# Patient Record
Sex: FEMALE | Race: BLACK OR AFRICAN AMERICAN | NOT HISPANIC OR LATINO | Employment: UNEMPLOYED | URBAN - METROPOLITAN AREA
[De-identification: names, ages, dates, MRNs, and addresses within clinical notes are randomized per-mention and may not be internally consistent; named-entity substitution may affect disease eponyms.]

---

## 2017-07-19 ENCOUNTER — HOSPITAL ENCOUNTER (EMERGENCY)
Facility: MEDICAL CENTER | Age: 22
End: 2017-07-19
Payer: MEDICAID

## 2017-07-19 VITALS
WEIGHT: 113.32 LBS | DIASTOLIC BLOOD PRESSURE: 49 MMHG | HEIGHT: 62 IN | OXYGEN SATURATION: 99 % | TEMPERATURE: 98.6 F | HEART RATE: 69 BPM | RESPIRATION RATE: 20 BRPM | SYSTOLIC BLOOD PRESSURE: 99 MMHG | BODY MASS INDEX: 20.85 KG/M2

## 2017-07-19 PROCEDURE — 302449 STATCHG TRIAGE ONLY (STATISTIC)

## 2017-07-19 ASSESSMENT — PAIN SCALES - GENERAL: PAINLEVEL_OUTOF10: 0

## 2017-07-20 NOTE — ED NOTES
".  Chief Complaint   Patient presents with   • Pregnancy     Patient states that she had positive pregnancy test in May, with confirmation blood work done in  out of town and \"hasn't felt the baby move.\"  Patient states that she estimates she is 16 wks pregnant, but does not have a OB/GYN, and has not received prenatal care.  Patient is a poor historian.  3, Parity 1, one miscarriage.    Denies ABD pain, vaginal bleeding or other symptoms.         "

## 2017-08-14 ENCOUNTER — HOSPITAL ENCOUNTER (OUTPATIENT)
Facility: MEDICAL CENTER | Age: 22
End: 2017-08-14
Attending: OBSTETRICS & GYNECOLOGY | Admitting: OBSTETRICS & GYNECOLOGY

## 2017-08-14 ENCOUNTER — HOSPITAL ENCOUNTER (EMERGENCY)
Facility: MEDICAL CENTER | Age: 22
End: 2017-08-14

## 2017-08-14 VITALS
WEIGHT: 115 LBS | SYSTOLIC BLOOD PRESSURE: 109 MMHG | HEIGHT: 62 IN | BODY MASS INDEX: 21.16 KG/M2 | HEART RATE: 85 BPM | DIASTOLIC BLOOD PRESSURE: 60 MMHG | TEMPERATURE: 98.5 F

## 2017-08-14 LAB
APPEARANCE UR: ABNORMAL
COLOR UR AUTO: YELLOW
GLUCOSE UR QL STRIP.AUTO: NEGATIVE MG/DL
KETONES UR QL STRIP.AUTO: NEGATIVE MG/DL
LEUKOCYTE ESTERASE UR QL STRIP.AUTO: ABNORMAL
NITRITE UR QL STRIP.AUTO: NEGATIVE
PH UR STRIP.AUTO: 6.5 [PH]
PROT UR QL STRIP: ABNORMAL MG/DL
RBC UR QL AUTO: NEGATIVE
SP GR UR: 1.02

## 2017-08-14 PROCEDURE — 81002 URINALYSIS NONAUTO W/O SCOPE: CPT

## 2017-08-15 NOTE — PROGRESS NOTES
"2200 pt arrived to L&D with complaints of right upper quadrant abdominal pain that she states started tonight around 1600 and has since gone away but that she wanted to be checked to make sure every thing is okay. Pt has had no prenatal care with this pregnancy but was seen by [facility] in 2014 with first baby. Per the patient, this is her third pregnancy but she miscarried the last. Pt taken to labor assessment, EFM and TOCO applied, FHR was found in the 135-140's with audible variability. Pt also complains of vaginal pain/itching and a burning sensation and states her vagina is \"swollen\". Per visual assessment, no lesions noted and vaginal area did not seem to be edematous per RN. When asked if she felt the burning when she urinated, she states \"i haven't wanted to go to the bathroom because i was scared it would hurt\". Urine sample obtained and dipped-see results.. Pt repots positive fetal moment, denies any leaking of vaginal fluid or blood and denies feeling any contractions.   2230 HUEY LOONEY updated on pt status, per Cristina, there is no indication at this time for an ultrasound as pt needs to establish prenatal care and be seen by a provider. FHR tracing reviewed and looks reassuring for projected gestational age (from what we went off per the pt, her LMP was in March which would make her EDC around 12/6/17= 23.5 today). Orders received to discharge pt home and instruct her to buy OTC Monistat 7 and use as directed. Pt education provided regarding monistat 7 and pt was instructed to refrain from intercourse for 7 days while taking this medication. Extensive discharge education provided to pt and she verbalized understanding. Pt encouraged and states she will see [facility] ASAP to establish prenatal care. Pt verbalized understanding of all discharge instructions. FHR strip reviewed in department by HUEY LOONEY prior to discharge.   2240 pt discharged home in stable condition.   "

## 2017-12-15 ENCOUNTER — HOSPITAL ENCOUNTER (OUTPATIENT)
Facility: MEDICAL CENTER | Age: 22
End: 2017-12-15
Attending: OBSTETRICS & GYNECOLOGY | Admitting: OBSTETRICS & GYNECOLOGY
Payer: MEDICAID

## 2017-12-15 ENCOUNTER — HOSPITAL ENCOUNTER (EMERGENCY)
Facility: MEDICAL CENTER | Age: 22
End: 2017-12-15
Payer: COMMERCIAL

## 2017-12-15 VITALS
BODY MASS INDEX: 23.37 KG/M2 | TEMPERATURE: 98.3 F | HEIGHT: 62 IN | WEIGHT: 127 LBS | HEART RATE: 81 BPM | DIASTOLIC BLOOD PRESSURE: 76 MMHG | SYSTOLIC BLOOD PRESSURE: 121 MMHG

## 2017-12-15 LAB
ABO GROUP BLD: NORMAL
BASOPHILS # BLD AUTO: 0.4 % (ref 0–1.8)
BASOPHILS # BLD: 0.03 K/UL (ref 0–0.12)
BLD GP AB SCN SERPL QL: NORMAL
EOSINOPHIL # BLD AUTO: 0.02 K/UL (ref 0–0.51)
EOSINOPHIL NFR BLD: 0.2 % (ref 0–6.9)
ERYTHROCYTE [DISTWIDTH] IN BLOOD BY AUTOMATED COUNT: 43.8 FL (ref 35.9–50)
HBV SURFACE AG SER QL: NEGATIVE
HCT VFR BLD AUTO: 36.6 % (ref 37–47)
HCV AB SER QL: NEGATIVE
HGB BLD-MCNC: 12.1 G/DL (ref 12–16)
HIV 1+2 AB+HIV1 P24 AG SERPL QL IA: NON REACTIVE
IMM GRANULOCYTES # BLD AUTO: 0.04 K/UL (ref 0–0.11)
IMM GRANULOCYTES NFR BLD AUTO: 0.5 % (ref 0–0.9)
LYMPHOCYTES # BLD AUTO: 2.13 K/UL (ref 1–4.8)
LYMPHOCYTES NFR BLD: 25.3 % (ref 22–41)
MCH RBC QN AUTO: 30 PG (ref 27–33)
MCHC RBC AUTO-ENTMCNC: 33.1 G/DL (ref 33.6–35)
MCV RBC AUTO: 90.8 FL (ref 81.4–97.8)
MONOCYTES # BLD AUTO: 0.82 K/UL (ref 0–0.85)
MONOCYTES NFR BLD AUTO: 9.8 % (ref 0–13.4)
NEUTROPHILS # BLD AUTO: 5.37 K/UL (ref 2–7.15)
NEUTROPHILS NFR BLD: 63.8 % (ref 44–72)
NRBC # BLD AUTO: 0 K/UL
NRBC BLD AUTO-RTO: 0 /100 WBC
PLATELET # BLD AUTO: 177 K/UL (ref 164–446)
PMV BLD AUTO: 11.2 FL (ref 9–12.9)
RBC # BLD AUTO: 4.03 M/UL (ref 4.2–5.4)
RH BLD: NORMAL
RUBV AB SER QL: 89.8 IU/ML
TREPONEMA PALLIDUM IGG+IGM AB [PRESENCE] IN SERUM OR PLASMA BY IMMUNOASSAY: NON REACTIVE
WBC # BLD AUTO: 8.4 K/UL (ref 4.8–10.8)

## 2017-12-15 PROCEDURE — 59025 FETAL NON-STRESS TEST: CPT | Performed by: FAMILY MEDICINE

## 2017-12-15 PROCEDURE — 86850 RBC ANTIBODY SCREEN: CPT

## 2017-12-15 PROCEDURE — 86901 BLOOD TYPING SEROLOGIC RH(D): CPT

## 2017-12-15 PROCEDURE — 85025 COMPLETE CBC W/AUTO DIFF WBC: CPT

## 2017-12-15 PROCEDURE — 86762 RUBELLA ANTIBODY: CPT

## 2017-12-15 PROCEDURE — 87340 HEPATITIS B SURFACE AG IA: CPT

## 2017-12-15 PROCEDURE — 86780 TREPONEMA PALLIDUM: CPT

## 2017-12-15 PROCEDURE — 36415 COLL VENOUS BLD VENIPUNCTURE: CPT

## 2017-12-15 PROCEDURE — 86803 HEPATITIS C AB TEST: CPT

## 2017-12-15 PROCEDURE — 86900 BLOOD TYPING SEROLOGIC ABO: CPT

## 2017-12-15 PROCEDURE — 87389 HIV-1 AG W/HIV-1&-2 AB AG IA: CPT

## 2017-12-16 NOTE — PROGRESS NOTES
" EDC  39w3d. Pt presents to L&D with complaints of \"pain\". PT taken to triage for assessment.      TOCO and EFM applied, VSS. PT stated that she has had two visits with [facility] and an US giving her the due date of . No records on file, will attempt to obtain. Pt stated that she never got any of her prenatal labs drawn. Pt reports +FM, denies LOF or VB. SVE fingertip/50/ballot     Dr. Harp paged     Dr. Harp returned page, stated that pt is no longer being seen by their practice because she failed to get her PNL and return for her follow up appointments. PT did have an US done on  giving her an SWATHI of . The fetus was breech at the time. Will contact GURU Hawk regarding [facility] [place] status.     2218 GURU Hawk CNM updated, orders to collect PNL, GBS and have US at bedside to verify fetal positioning.     2223 GURU Hawk at bedside, vertex. GURU Hawk reviewed tracing, okay to discharge home once labs have been drawn.     2230 Lab at bedside    2235 RN at bedside, labor precautions given, pt verbalized understanding.     2240 Pt discharged home in stable condition.   "

## 2017-12-16 NOTE — PROGRESS NOTES
"S: Pt is a 22 y.o.  at 39w3d according to stated 28wk US through [facility]. who presents to triage c/o CTXs.  No VB, RUCs, LOF.  Reports positve FM.  She had one prenatal visit with [facility] on 2017 and did not follow up and has since been fired form their practice.     O: /76   Pulse 81   Temp 36.8 °C (98.3 °F)   Ht 1.575 m (5' 2\")   Wt 57.6 kg (127 lb)   LMP 10/30/2014   BMI 23.23 kg/m²          FHTs: baseline 120, + accels, - decels, moderate variability       [place]:  UCs q 2-3 minutes apart, palpating mild       SVE: FT/50/ballotable per RN exam unable to determine presentation       Presentation: vertex- confirmed by BSUS         A/P  Patient Active Problem List    Diagnosis Date Noted   • Labor and delivery, indication for care 2014   • Supervision of normal first pregnancy 2014   • Late prenatal care @ 22 wks  2014       1.  IUP @ Unknown  2.  Reactive NST.  3.  Prenatal labs  4.  GBS  5. Labor precautions given, pt will f/u at L&D if she feels she is in active labor    Sherry Hawk CNM, APRN      "

## 2017-12-24 ENCOUNTER — HOSPITAL ENCOUNTER (INPATIENT)
Facility: MEDICAL CENTER | Age: 22
LOS: 2 days | End: 2017-12-26
Attending: OBSTETRICS & GYNECOLOGY | Admitting: OBSTETRICS & GYNECOLOGY
Payer: MEDICAID

## 2017-12-24 LAB
BASOPHILS # BLD AUTO: 0.3 % (ref 0–1.8)
BASOPHILS # BLD: 0.03 K/UL (ref 0–0.12)
EOSINOPHIL # BLD AUTO: 0.01 K/UL (ref 0–0.51)
EOSINOPHIL NFR BLD: 0.1 % (ref 0–6.9)
ERYTHROCYTE [DISTWIDTH] IN BLOOD BY AUTOMATED COUNT: 43.4 FL (ref 35.9–50)
HCT VFR BLD AUTO: 38.5 % (ref 37–47)
HGB BLD-MCNC: 13 G/DL (ref 12–16)
HOLDING TUBE BB 8507: NORMAL
IMM GRANULOCYTES # BLD AUTO: 0.06 K/UL (ref 0–0.11)
IMM GRANULOCYTES NFR BLD AUTO: 0.7 % (ref 0–0.9)
LYMPHOCYTES # BLD AUTO: 0.95 K/UL (ref 1–4.8)
LYMPHOCYTES NFR BLD: 11.1 % (ref 22–41)
MCH RBC QN AUTO: 30.2 PG (ref 27–33)
MCHC RBC AUTO-ENTMCNC: 33.8 G/DL (ref 33.6–35)
MCV RBC AUTO: 89.5 FL (ref 81.4–97.8)
MONOCYTES # BLD AUTO: 1.06 K/UL (ref 0–0.85)
MONOCYTES NFR BLD AUTO: 12.4 % (ref 0–13.4)
NEUTROPHILS # BLD AUTO: 6.47 K/UL (ref 2–7.15)
NEUTROPHILS NFR BLD: 75.4 % (ref 44–72)
NRBC # BLD AUTO: 0 K/UL
NRBC BLD-RTO: 0 /100 WBC
PLATELET # BLD AUTO: 171 K/UL (ref 164–446)
PMV BLD AUTO: 11 FL (ref 9–12.9)
RBC # BLD AUTO: 4.3 M/UL (ref 4.2–5.4)
WBC # BLD AUTO: 8.6 K/UL (ref 4.8–10.8)

## 2017-12-24 PROCEDURE — 700105 HCHG RX REV CODE 258

## 2017-12-24 PROCEDURE — 304965 HCHG RECOVERY SERVICES

## 2017-12-24 PROCEDURE — 36415 COLL VENOUS BLD VENIPUNCTURE: CPT

## 2017-12-24 PROCEDURE — 85025 COMPLETE CBC W/AUTO DIFF WBC: CPT

## 2017-12-24 PROCEDURE — A9270 NON-COVERED ITEM OR SERVICE: HCPCS | Performed by: STUDENT IN AN ORGANIZED HEALTH CARE EDUCATION/TRAINING PROGRAM

## 2017-12-24 PROCEDURE — 700102 HCHG RX REV CODE 250 W/ 637 OVERRIDE(OP): Performed by: STUDENT IN AN ORGANIZED HEALTH CARE EDUCATION/TRAINING PROGRAM

## 2017-12-24 PROCEDURE — 770002 HCHG ROOM/CARE - OB PRIVATE (112)

## 2017-12-24 PROCEDURE — 59409 OBSTETRICAL CARE: CPT

## 2017-12-24 PROCEDURE — 3E0R3BZ INTRODUCTION OF ANESTHETIC AGENT INTO SPINAL CANAL, PERCUTANEOUS APPROACH: ICD-10-PCS

## 2017-12-24 PROCEDURE — 700111 HCHG RX REV CODE 636 W/ 250 OVERRIDE (IP): Performed by: STUDENT IN AN ORGANIZED HEALTH CARE EDUCATION/TRAINING PROGRAM

## 2017-12-24 RX ORDER — METHYLERGONOVINE MALEATE 0.2 MG/ML
0.2 INJECTION INTRAVENOUS
Status: DISCONTINUED | OUTPATIENT
Start: 2017-12-24 | End: 2017-12-24 | Stop reason: HOSPADM

## 2017-12-24 RX ORDER — CARBOPROST TROMETHAMINE 250 UG/ML
250 INJECTION, SOLUTION INTRAMUSCULAR
Status: DISCONTINUED | OUTPATIENT
Start: 2017-12-24 | End: 2017-12-26 | Stop reason: HOSPADM

## 2017-12-24 RX ORDER — SODIUM CHLORIDE, SODIUM LACTATE, POTASSIUM CHLORIDE, CALCIUM CHLORIDE 600; 310; 30; 20 MG/100ML; MG/100ML; MG/100ML; MG/100ML
INJECTION, SOLUTION INTRAVENOUS
Status: COMPLETED
Start: 2017-12-24 | End: 2017-12-24

## 2017-12-24 RX ORDER — MAG HYDROX/ALUMINUM HYD/SIMETH 400-400-40
1 SUSPENSION, ORAL (FINAL DOSE FORM) ORAL
Status: DISCONTINUED | OUTPATIENT
Start: 2017-12-24 | End: 2017-12-26 | Stop reason: HOSPADM

## 2017-12-24 RX ORDER — MISOPROSTOL 200 UG/1
600 TABLET ORAL
Status: DISCONTINUED | OUTPATIENT
Start: 2017-12-24 | End: 2017-12-26 | Stop reason: HOSPADM

## 2017-12-24 RX ORDER — BISACODYL 10 MG
10 SUPPOSITORY, RECTAL RECTAL PRN
Status: DISCONTINUED | OUTPATIENT
Start: 2017-12-24 | End: 2017-12-26 | Stop reason: HOSPADM

## 2017-12-24 RX ORDER — PENICILLIN G POTASSIUM 5000000 [IU]/1
5 INJECTION, POWDER, FOR SOLUTION INTRAMUSCULAR; INTRAVENOUS ONCE
Status: COMPLETED | OUTPATIENT
Start: 2017-12-24 | End: 2017-12-24

## 2017-12-24 RX ORDER — ROPIVACAINE HYDROCHLORIDE 2 MG/ML
INJECTION, SOLUTION EPIDURAL; INFILTRATION; PERINEURAL
Status: COMPLETED
Start: 2017-12-24 | End: 2017-12-24

## 2017-12-24 RX ORDER — ONDANSETRON 2 MG/ML
4 INJECTION INTRAMUSCULAR; INTRAVENOUS EVERY 6 HOURS PRN
Status: DISCONTINUED | OUTPATIENT
Start: 2017-12-24 | End: 2017-12-26 | Stop reason: HOSPADM

## 2017-12-24 RX ORDER — OXYCODONE HYDROCHLORIDE AND ACETAMINOPHEN 5; 325 MG/1; MG/1
1 TABLET ORAL EVERY 4 HOURS PRN
Status: DISCONTINUED | OUTPATIENT
Start: 2017-12-24 | End: 2017-12-26 | Stop reason: HOSPADM

## 2017-12-24 RX ORDER — IBUPROFEN 600 MG/1
600 TABLET ORAL EVERY 6 HOURS PRN
Status: DISCONTINUED | OUTPATIENT
Start: 2017-12-24 | End: 2017-12-26 | Stop reason: HOSPADM

## 2017-12-24 RX ORDER — MISOPROSTOL 200 UG/1
800 TABLET ORAL
Status: DISCONTINUED | OUTPATIENT
Start: 2017-12-24 | End: 2017-12-24 | Stop reason: HOSPADM

## 2017-12-24 RX ORDER — SODIUM CHLORIDE, SODIUM LACTATE, POTASSIUM CHLORIDE, CALCIUM CHLORIDE 600; 310; 30; 20 MG/100ML; MG/100ML; MG/100ML; MG/100ML
INJECTION, SOLUTION INTRAVENOUS PRN
Status: DISCONTINUED | OUTPATIENT
Start: 2017-12-24 | End: 2017-12-26 | Stop reason: HOSPADM

## 2017-12-24 RX ORDER — ACETAMINOPHEN 325 MG/1
325 TABLET ORAL EVERY 4 HOURS PRN
Status: DISCONTINUED | OUTPATIENT
Start: 2017-12-24 | End: 2017-12-26 | Stop reason: HOSPADM

## 2017-12-24 RX ORDER — OXYCODONE AND ACETAMINOPHEN 10; 325 MG/1; MG/1
1 TABLET ORAL EVERY 4 HOURS PRN
Status: DISCONTINUED | OUTPATIENT
Start: 2017-12-24 | End: 2017-12-26 | Stop reason: HOSPADM

## 2017-12-24 RX ORDER — VITAMIN A ACETATE, BETA CAROTENE, ASCORBIC ACID, CHOLECALCIFEROL, .ALPHA.-TOCOPHEROL ACETATE, DL-, THIAMINE MONONITRATE, RIBOFLAVIN, NIACINAMIDE, PYRIDOXINE HYDROCHLORIDE, FOLIC ACID, CYANOCOBALAMIN, CALCIUM CARBONATE, FERROUS FUMARATE, ZINC OXIDE, CUPRIC OXIDE 3080; 12; 120; 400; 1; 1.84; 3; 20; 22; 920; 25; 200; 27; 10; 2 [IU]/1; UG/1; MG/1; [IU]/1; MG/1; MG/1; MG/1; MG/1; MG/1; [IU]/1; MG/1; MG/1; MG/1; MG/1; MG/1
1 TABLET, FILM COATED ORAL EVERY MORNING
Status: DISCONTINUED | OUTPATIENT
Start: 2017-12-25 | End: 2017-12-26 | Stop reason: HOSPADM

## 2017-12-24 RX ORDER — ALUMINA, MAGNESIA, AND SIMETHICONE 2400; 2400; 240 MG/30ML; MG/30ML; MG/30ML
30 SUSPENSION ORAL EVERY 6 HOURS PRN
Status: DISCONTINUED | OUTPATIENT
Start: 2017-12-24 | End: 2017-12-24 | Stop reason: HOSPADM

## 2017-12-24 RX ORDER — ONDANSETRON 4 MG/1
4 TABLET, ORALLY DISINTEGRATING ORAL EVERY 6 HOURS PRN
Status: DISCONTINUED | OUTPATIENT
Start: 2017-12-24 | End: 2017-12-26 | Stop reason: HOSPADM

## 2017-12-24 RX ORDER — DOCUSATE SODIUM 100 MG/1
100 CAPSULE, LIQUID FILLED ORAL 2 TIMES DAILY PRN
Status: DISCONTINUED | OUTPATIENT
Start: 2017-12-24 | End: 2017-12-26 | Stop reason: HOSPADM

## 2017-12-24 RX ORDER — METHYLERGONOVINE MALEATE 0.2 MG/ML
0.2 INJECTION INTRAVENOUS
Status: DISCONTINUED | OUTPATIENT
Start: 2017-12-24 | End: 2017-12-26 | Stop reason: HOSPADM

## 2017-12-24 RX ADMIN — SODIUM CHLORIDE, POTASSIUM CHLORIDE, SODIUM LACTATE AND CALCIUM CHLORIDE 1000 ML: 600; 310; 30; 20 INJECTION, SOLUTION INTRAVENOUS at 14:57

## 2017-12-24 RX ADMIN — PENICILLIN G POTASSIUM 5 MILLION UNITS: 5000000 POWDER, FOR SOLUTION INTRAMUSCULAR; INTRAPLEURAL; INTRATHECAL; INTRAVENOUS at 14:52

## 2017-12-24 RX ADMIN — IBUPROFEN 600 MG: 600 TABLET, FILM COATED ORAL at 18:32

## 2017-12-24 RX ADMIN — OXYCODONE HYDROCHLORIDE AND ACETAMINOPHEN 1 TABLET: 10; 325 TABLET ORAL at 18:32

## 2017-12-24 RX ADMIN — SODIUM CHLORIDE, POTASSIUM CHLORIDE, SODIUM LACTATE AND CALCIUM CHLORIDE 1000 ML: 600; 310; 30; 20 INJECTION, SOLUTION INTRAVENOUS at 10:00

## 2017-12-24 RX ADMIN — Medication 2000 ML/HR: at 18:52

## 2017-12-24 RX ADMIN — OXYCODONE HYDROCHLORIDE AND ACETAMINOPHEN 1 TABLET: 10; 325 TABLET ORAL at 22:40

## 2017-12-24 RX ADMIN — FENTANYL CITRATE 100 MCG: 50 INJECTION INTRAMUSCULAR; INTRAVENOUS at 16:37

## 2017-12-24 ASSESSMENT — COPD QUESTIONNAIRES
DURING THE PAST 4 WEEKS HOW MUCH DID YOU FEEL SHORT OF BREATH: NONE/LITTLE OF THE TIME
HAVE YOU SMOKED AT LEAST 100 CIGARETTES IN YOUR ENTIRE LIFE: NO/DON'T KNOW
COPD SCREENING SCORE: 0
DO YOU EVER COUGH UP ANY MUCUS OR PHLEGM?: NO/ONLY WITH OCCASIONAL COLDS OR INFECTIONS
DURING THE PAST 4 WEEKS HOW MUCH DID YOU FEEL SHORT OF BREATH: NONE/LITTLE OF THE TIME
COPD SCREENING SCORE: 0
HAVE YOU SMOKED AT LEAST 100 CIGARETTES IN YOUR ENTIRE LIFE: NO/DON'T KNOW
DO YOU EVER COUGH UP ANY MUCUS OR PHLEGM?: NO/ONLY WITH OCCASIONAL COLDS OR INFECTIONS

## 2017-12-24 ASSESSMENT — LIFESTYLE VARIABLES
DO YOU DRINK ALCOHOL: NO
EVER_SMOKED: YES
DO YOU DRINK ALCOHOL: NO
ALCOHOL_USE: NO

## 2017-12-24 ASSESSMENT — PAIN SCALES - GENERAL
PAINLEVEL_OUTOF10: 7
PAINLEVEL_OUTOF10: 1

## 2017-12-24 ASSESSMENT — PATIENT HEALTH QUESTIONNAIRE - PHQ9
1. LITTLE INTEREST OR PLEASURE IN DOING THINGS: NOT AT ALL
2. FEELING DOWN, DEPRESSED, IRRITABLE, OR HOPELESS: NOT AT ALL
SUM OF ALL RESPONSES TO PHQ QUESTIONS 1-9: 0
SUM OF ALL RESPONSES TO PHQ9 QUESTIONS 1 AND 2: 0
SUM OF ALL RESPONSES TO PHQ9 QUESTIONS 1 AND 2: 0
2. FEELING DOWN, DEPRESSED, IRRITABLE, OR HOPELESS: NOT AT ALL
1. LITTLE INTEREST OR PLEASURE IN DOING THINGS: NOT AT ALL
SUM OF ALL RESPONSES TO PHQ QUESTIONS 1-9: 0

## 2017-12-24 NOTE — PROGRESS NOTES
1425 pt arrived to L&D, report from charge RN, admit orders received, IV started, labs drawn and sent, admission profile completed, POC discussed. Orders received to hang abx since GBS is unknown and pt is a walk-in with very limited prenatal care. SVE per charge RN.  1500 SVE as noted prior to epidural.  1506 Dr Somers at bedside to place epidural, consents signed, pt verbalized understanding.   1512 pt decided she doesn't want epidural placed, Dr. Somers only numbed the skin at this time. Pt laid back down in bed, will continue with natural labor at this time. VSS. Pt encouraged to call RN for any needs wants desires or concerns, pt encouraged to call RN if she feels any pressure.  1635 Dr. Arce at bedside, SVE as noted.   1637 fentanyl admin, see MAR.  1730 SVE as noted, pt complete, Dr Arce to attend delivery.  1806 viable baby girl born via Dr. Arce, APGARs 8/9.  1809 placenta delivered spontaneously, intact.  1900 report given to NOC RN, assumed care, POC discussed.

## 2017-12-24 NOTE — H&P
History and Physical      Wade Bergeron is a 22 y.o. year old female  walk-in at estimated 39w3d by supposed 28 week ultrasound done through [facility] (no records obtained) who presents in active labor. Patient states she had 2 prenatal visits with [facility] after which she was fired from the practice for not following up.     Subjective:   positive  For CTXS.   positive Feels pain   positive for LOF  positive - vaginal spotting   positive for fetal movement    ROS: A comprehensive review of systems was negative.    History reviewed. No pertinent past medical history.  History reviewed. No pertinent surgical history.  OB History    Para Term  AB Living   3 1     1     SAB TAB Ectopic Molar Multiple Live Births   1                # Outcome Date GA Lbr Harvey/2nd Weight Sex Delivery Anes PTL Lv   3 Current            2 SAB            1 Para                 Social History     Social History   • Marital status: Single     Spouse name: N/A   • Number of children: N/A   • Years of education: N/A     Occupational History   • Not on file.     Social History Main Topics   • Smoking status: Never Smoker   • Smokeless tobacco: Never Used      Comment: Quit 2013   • Alcohol use No   • Drug use:       Comment: stopped marajuana one year ago.    • Sexual activity: Not on file     Other Topics Concern   • Not on file     Social History Narrative   • No narrative on file     Allergies: Patient has no known allergies.    Current Facility-Administered Medications:   •  LACTATED RINGERS IV SOLN, , , ,   •  fentaNYL (SUBLIMAZE) injection 50 mcg, 50 mcg, Intravenous, Q HOUR PRN, Evelyn Morales M.D.  •  fentaNYL (SUBLIMAZE) injection 100 mcg, 100 mcg, Intravenous, Q HOUR PRN, Evelyn Morales M.D.  •  mag hydrox-al hydrox-simeth (MAALOX PLUS ES or MYLANTA DS) suspension 30 mL, 30 mL, Oral, Q6HRS PRN, Evelyn Morales M.D.  •  penicillin G potassium injection 5 Million Units, 5 Million Units, Intravenous, Once **FOLLOWED BY**  "penicillin G potassium 2.5 Million Units in  mL IVPB, 2.5 Million Units, Intravenous, Q4HRS, Evelyn Morales M.D.  •  misoprostol (CYTOTEC) tablet 800 mcg, 800 mcg, Rectal, Once PRN, Evelyn Morales M.D.  •  methylergonovine (METHERGINE) injection 0.2 mg, 0.2 mg, Intramuscular, Once PRN, Evelyn Morales M.D.    Limited prenatal care, only 2 visits early on in pregnancy  Patient Active Problem List    Diagnosis Date Noted   • Labor and delivery, indication for care 05/26/2014   • Supervision of normal first pregnancy 01/07/2014   • Late prenatal care @ 22 wks  01/07/2014       Objective:      Blood pressure 121/68, pulse 92, temperature 36.4 °C (97.5 °F), height 1.575 m (5' 2\"), weight 59 kg (130 lb), last menstrual period 10/30/2014.    General:   alert, cooperative   Skin:   normal   HEENT:  PERRLA and EOMI   Lungs:   CTA bilateral   Heart:   S1, S2 normal, no murmur, click, rub or gallop, regular rate and rhythm, no pedal edema, no JVD, no hepatosplenomegaly   Abdomen:   gravid, NT   EFW:  3500g   Pelvis:  adequate with gynecoid pelvis   FHT:  130 BPM   Uterine Size: S=D   Presentations: Cephalic   Cervix:     Dilation: 6cm    Effacement: 90%    Station:  0    Consistency: Soft    Position: Middle     Lab Review  Lab:   Blood type: O     Recent Results (from the past 5880 hour(s))   POC UA    Collection Time: 08/14/17 10:31 PM   Result Value Ref Range    POC Color Yellow     POC Appearance Slightly Cloudy (A)     POC Glucose Negative Negative mg/dL    POC Ketones Negative Negative mg/dL    POC Specific Gravity 1.025 1.005 - 1.030    POC Blood Negative Negative    POC Urine PH 6.5 5.0 - 8.0    POC Protein Trace (A) Negative mg/dL    POC Nitrites Negative Negative    POC Leukocyte Esterase Moderate (A) Negative   OP PRENATAL PANEL-BLOOD BANK    Collection Time: 12/15/17 10:30 PM   Result Value Ref Range    ABO Grouping Only O     Rh Grouping Only POS     Antibody Screen Scrn NEG    PRENATAL PANEL 3+HIV+HCV    " Collection Time: 12/15/17 10:34 PM   Result Value Ref Range    WBC 8.4 4.8 - 10.8 K/uL    RBC 4.03 (L) 4.20 - 5.40 M/uL    Hemoglobin 12.1 12.0 - 16.0 g/dL    Hematocrit 36.6 (L) 37.0 - 47.0 %    MCV 90.8 81.4 - 97.8 fL    MCH 30.0 27.0 - 33.0 pg    MCHC 33.1 (L) 33.6 - 35.0 g/dL    RDW 43.8 35.9 - 50.0 fL    Platelet Count 177 164 - 446 K/uL    MPV 11.2 9.0 - 12.9 fL    Neutrophils-Polys 63.80 44.00 - 72.00 %    Lymphocytes 25.30 22.00 - 41.00 %    Monocytes 9.80 0.00 - 13.40 %    Eosinophils 0.20 0.00 - 6.90 %    Basophils 0.40 0.00 - 1.80 %    Immature Granulocytes 0.50 0.00 - 0.90 %    Nucleated RBC 0.00 /100 WBC    Neutrophils (Absolute) 5.37 2.00 - 7.15 K/uL    Lymphs (Absolute) 2.13 1.00 - 4.80 K/uL    Monos (Absolute) 0.82 0.00 - 0.85 K/uL    Eos (Absolute) 0.02 0.00 - 0.51 K/uL    Baso (Absolute) 0.03 0.00 - 0.12 K/uL    Immature Granulocytes (abs) 0.04 0.00 - 0.11 K/uL    NRBC (Absolute) 0.00 K/uL    Rubella IgG Antibody 89.80 IU/mL    Syphilis, Treponemal Qual Non Reactive Non Reactive    Hepatitis B Surface Antigen Negative Negative    Hepatitis C Antibody Negative Negative   HIV ANTIBODIES    Collection Time: 12/15/17 10:34 PM   Result Value Ref Range    HIV Ag/Ab Combo Assay Non Reactive Non Reactive   CBC WITH DIFFERENTIAL    Collection Time: 12/24/17  2:30 PM   Result Value Ref Range    WBC 8.6 4.8 - 10.8 K/uL    RBC 4.30 4.20 - 5.40 M/uL    Hemoglobin 13.0 12.0 - 16.0 g/dL    Hematocrit 38.5 37.0 - 47.0 %    MCV 89.5 81.4 - 97.8 fL    MCH 30.2 27.0 - 33.0 pg    MCHC 33.8 33.6 - 35.0 g/dL    RDW 43.4 35.9 - 50.0 fL    Platelet Count 171 164 - 446 K/uL    MPV 11.0 9.0 - 12.9 fL    Neutrophils-Polys 75.40 (H) 44.00 - 72.00 %    Lymphocytes 11.10 (L) 22.00 - 41.00 %    Monocytes 12.40 0.00 - 13.40 %    Eosinophils 0.10 0.00 - 6.90 %    Basophils 0.30 0.00 - 1.80 %    Immature Granulocytes 0.70 0.00 - 0.90 %    Nucleated RBC 0.00 /100 WBC    Neutrophils (Absolute) 6.47 2.00 - 7.15 K/uL    Lymphs  (Absolute) 0.95 (L) 1.00 - 4.80 K/uL    Monos (Absolute) 1.06 (H) 0.00 - 0.85 K/uL    Eos (Absolute) 0.01 0.00 - 0.51 K/uL    Baso (Absolute) 0.03 0.00 - 0.12 K/uL    Immature Granulocytes (abs) 0.06 0.00 - 0.11 K/uL    NRBC (Absolute) 0.00 K/uL   HOLD BLOOD BANK SPECIMEN (NOT TESTED)    Collection Time: 17  2:30 PM   Result Value Ref Range    Holding Tube - Bb DONE         Assessment:   Wade Bergeron at Unknown  Labor status: Active phase labor.  Obstetrical history significant for   Patient Active Problem List    Diagnosis Date Noted   • Labor and delivery, indication for care 2014   • Supervision of normal first pregnancy 2014   • Late prenatal care @ 22 wks  2014   .   CAT 1 FHT  Plan:     Admit to L&D for active labor  GBS unknown, hx of GBS positive in prior pregnancy- will give PCN ppx  PNL wnl   Utox pending  Anticipate

## 2017-12-25 LAB
ERYTHROCYTE [DISTWIDTH] IN BLOOD BY AUTOMATED COUNT: 42.4 FL (ref 35.9–50)
HCT VFR BLD AUTO: 33.1 % (ref 37–47)
HGB BLD-MCNC: 11.1 G/DL (ref 12–16)
MCH RBC QN AUTO: 30.1 PG (ref 27–33)
MCHC RBC AUTO-ENTMCNC: 33.5 G/DL (ref 33.6–35)
MCV RBC AUTO: 89.7 FL (ref 81.4–97.8)
PLATELET # BLD AUTO: 164 K/UL (ref 164–446)
PMV BLD AUTO: 11.4 FL (ref 9–12.9)
RBC # BLD AUTO: 3.69 M/UL (ref 4.2–5.4)
WBC # BLD AUTO: 12.1 K/UL (ref 4.8–10.8)

## 2017-12-25 PROCEDURE — 85027 COMPLETE CBC AUTOMATED: CPT

## 2017-12-25 PROCEDURE — 36415 COLL VENOUS BLD VENIPUNCTURE: CPT

## 2017-12-25 PROCEDURE — 700102 HCHG RX REV CODE 250 W/ 637 OVERRIDE(OP): Performed by: NURSE PRACTITIONER

## 2017-12-25 PROCEDURE — 700102 HCHG RX REV CODE 250 W/ 637 OVERRIDE(OP): Performed by: STUDENT IN AN ORGANIZED HEALTH CARE EDUCATION/TRAINING PROGRAM

## 2017-12-25 PROCEDURE — A9270 NON-COVERED ITEM OR SERVICE: HCPCS | Performed by: NURSE PRACTITIONER

## 2017-12-25 PROCEDURE — A9270 NON-COVERED ITEM OR SERVICE: HCPCS | Performed by: STUDENT IN AN ORGANIZED HEALTH CARE EDUCATION/TRAINING PROGRAM

## 2017-12-25 PROCEDURE — 770002 HCHG ROOM/CARE - OB PRIVATE (112)

## 2017-12-25 RX ADMIN — OXYCODONE HYDROCHLORIDE AND ACETAMINOPHEN 1 TABLET: 10; 325 TABLET ORAL at 16:49

## 2017-12-25 RX ADMIN — Medication 1 TABLET: at 09:39

## 2017-12-25 RX ADMIN — IBUPROFEN 600 MG: 600 TABLET, FILM COATED ORAL at 16:49

## 2017-12-25 RX ADMIN — OXYCODONE HYDROCHLORIDE AND ACETAMINOPHEN 1 TABLET: 10; 325 TABLET ORAL at 20:55

## 2017-12-25 ASSESSMENT — PAIN SCALES - GENERAL
PAINLEVEL_OUTOF10: 0
PAINLEVEL_OUTOF10: 2
PAINLEVEL_OUTOF10: 6
PAINLEVEL_OUTOF10: 3
PAINLEVEL_OUTOF10: 0
PAINLEVEL_OUTOF10: 7
PAINLEVEL_OUTOF10: 0

## 2017-12-25 NOTE — PROGRESS NOTES
Assessment done, Hep lock removed.  Pt denies pain and will call when needs something.  Bonding with infant.

## 2017-12-25 NOTE — CARE PLAN
Problem: Alteration in comfort related to episiotomy, vaginal repair and/or after birth pains  Goal: Patient verbalizes acceptable pain level    Intervention: Assess effectiveness of pain meds within 1 hour of administration  Pain controlled with prn medications per mar. Pt will call to request pain medications. Will offer pain medications as they become available.      Problem: Potential knowledge deficit related to lack of understanding of self and  care  Goal: Patient will demonstrate ability to care for self and infant    Intervention: Assess patient and knowledge of self and infant care  t able to care for self and infant.

## 2017-12-25 NOTE — DELIVERY NOTE
Vaginal Delivery Note    Wade Bergeron is a  3, now para 2, who presented in active phase labor at Unknown.  Patient progressed in active labor spontaneously to cervical exam 100%; cervical dilation 10; station 0 to complete the first stage of labor.  Patient then progressed through second stage and delivered spontaneously a viable female infant over an intact perineum.  Nuchal cord present.  Infant Apgar 8 and 9, and weight pending.    During the third stage the placenta was delivered spontaneously and was intact with 3 vessels    Assisted extraction:  none    Perineum repair:  none    Analgesia: none    Epidural:  no    Family support:  yes    Infant bonding:  good    Estimated blood loss:  300 mL    Sponge count correct:  N\A    Patient tolerated the procedure:  excellent

## 2017-12-25 NOTE — PROGRESS NOTES
1900 - report from ANTHONY Cardoso RN.  Family x3 at bedside. POC discussed, questions encouraged and answered, understanding verbalized. Patient with no complaints of pain at this time  2015 -  up to bathroom with assistance from RN, tolerated well. Brenna care provided and taught, understanding verbalized.   2030 - transferred to postpartum via wheelchair in stable condition with infant in arms. Report to AGUSTO Kim RN.

## 2017-12-25 NOTE — PROGRESS NOTES
Pt. Arrived by wheelchair  to postpartum  with belongings to room 301, received report from Trena TERRY. Pt. Doing well. Funds firm, light chapis/rubra. Pt oriented to room, emergency call light and infant security. Pt. Aware of dangers of sleeping with infant. Call light within reach.

## 2017-12-25 NOTE — CARE PLAN
Problem: Risk for Fluid Imbalance  Goal: Promotion of Fluid Balance  Outcome: PROGRESSING AS EXPECTED

## 2017-12-25 NOTE — CARE PLAN
Problem: Pain  Goal: Alleviation of Pain or a reduction in pain to the patient's comfort goal  Outcome: PROGRESSING AS EXPECTED  Pain managed at this time, instructed patient on pharmacological and nonpharmogic interventions, verbalized understanding.    Problem: Risk for Infection, Impaired Wound Healing  Goal: Remain free from signs and symptoms of infection  Outcome: PROGRESSING AS EXPECTED  Patient afebrile upon assessment, no s/sx of infection. Instructed on proper hand hygiene, patient verbalized understanding.

## 2017-12-25 NOTE — PROGRESS NOTES
Post Partum Progress Note    Name:   Wade Bergeron   Date/Time:  12/25/2017 - 6:15 AM  Chief Admitting Dx:  Pregnancy  Labor and delivery, indication for care  Labor and delivery, indication for care  Delivery Type:  vaginal, spontaneous  Post-Op/Post Partum Days #:  1    Subjective:  Abdominal pain: no  Ambulating:   yes  Tolerating liquids:  yes  Tolerating food:  yes common adult  Flatus:   yes  BM:    no  Bleeding:   with a moderate amount of bleeding  Voiding:   yes  Dizziness:   no  Feeding:   both breast and bottle -     Vitals:    12/24/17 1900 12/24/17 2037 12/25/17 0000 12/25/17 0400   BP:  104/64 115/69 125/72   Pulse:  85 89 90   Resp:  19 19 18   Temp: 36.7 °C (98 °F) 37.3 °C (99.1 °F) 36.7 °C (98 °F) 36.8 °C (98.2 °F)   SpO2:  97% 95% 93%   Weight:       Height:           Exam:  Breast: Tenderness no  Abdomen: Abdomen soft, non-tender. BS normal. No masses,  No organomegaly  Fundal Tenderness:  no  Fundus Firm: yes  Incision: dry and intact  Below umbilicus: yes  Perineum: perineum intact  Lochia: mild  Extremities: Normal extremities, peripheral pulses and reflexes normal    Meds:  Current Facility-Administered Medications   Medication Dose   • ondansetron (ZOFRAN ODT) dispertab 4 mg  4 mg    Or   • ondansetron (ZOFRAN) syringe/vial injection 4 mg  4 mg   • oxytocin (PITOCIN) infusion (for postpartum)   mL/hr   • ibuprofen (MOTRIN) tablet 600 mg  600 mg   • acetaminophen (TYLENOL) tablet 325 mg  325 mg   • oxycodone-acetaminophen (PERCOCET) 5-325 MG per tablet 1 Tab  1 Tab   • oxycodone-acetaminophen (PERCOCET-10)  MG per tablet 1 Tab  1 Tab   • LR infusion     • PRN oxytocin (PITOCIN) (20 Units/1000 mL) PRN for excessive uterine bleeding - See Admin Instr  125-999 mL/hr   • misoprostol (CYTOTEC) tablet 600 mcg  600 mcg   • methylergonovine (METHERGINE) injection 0.2 mg  0.2 mg   • carboPROST (HEMABATE) injection 250 mcg  250 mcg   • docusate sodium (COLACE) capsule 100 mg  100 mg   •  bisacodyl (DULCOLAX) suppository 10 mg  10 mg   • glycerin (adult) suppository 1 Suppository  1 Suppository   • magnesium hydroxide (MILK OF MAGNESIA) suspension 30 mL  30 mL   • prenatal plus vitamin (STUARTNATAL 1+1) 27-1 MG tablet 1 Tab  1 Tab       Labs:   Recent Labs      12/24/17   1430  12/25/17   0220   WBC  8.6  12.1*   RBC  4.30  3.69*   HEMOGLOBIN  13.0  11.1*   HEMATOCRIT  38.5  33.1*   MCV  89.5  89.7   MCH  30.2  30.1   MCHC  33.8  33.5*   RDW  43.4  42.4   PLATELETCT  171  164   MPV  11.0  11.4       Assessment:  Chief Admitting Dx:  Pregnancy  Labor and delivery, indication for care  Labor and delivery, indication for care  Delivery Type:  vaginal, spontaneous  Tubal Ligation:  no    Plan:  Continue routine post partum care.      Sherri Arce M.D.

## 2017-12-26 VITALS
DIASTOLIC BLOOD PRESSURE: 72 MMHG | SYSTOLIC BLOOD PRESSURE: 99 MMHG | TEMPERATURE: 99.2 F | HEIGHT: 62 IN | RESPIRATION RATE: 18 BRPM | BODY MASS INDEX: 23.92 KG/M2 | WEIGHT: 130 LBS | HEART RATE: 69 BPM | OXYGEN SATURATION: 97 %

## 2017-12-26 PROCEDURE — 700102 HCHG RX REV CODE 250 W/ 637 OVERRIDE(OP): Performed by: NURSE PRACTITIONER

## 2017-12-26 PROCEDURE — 700102 HCHG RX REV CODE 250 W/ 637 OVERRIDE(OP): Performed by: STUDENT IN AN ORGANIZED HEALTH CARE EDUCATION/TRAINING PROGRAM

## 2017-12-26 PROCEDURE — A9270 NON-COVERED ITEM OR SERVICE: HCPCS | Performed by: STUDENT IN AN ORGANIZED HEALTH CARE EDUCATION/TRAINING PROGRAM

## 2017-12-26 PROCEDURE — A9270 NON-COVERED ITEM OR SERVICE: HCPCS | Performed by: NURSE PRACTITIONER

## 2017-12-26 RX ADMIN — OXYCODONE HYDROCHLORIDE AND ACETAMINOPHEN 1 TABLET: 5; 325 TABLET ORAL at 07:44

## 2017-12-26 RX ADMIN — IBUPROFEN 600 MG: 600 TABLET, FILM COATED ORAL at 07:44

## 2017-12-26 RX ADMIN — Medication 1 TABLET: at 07:45

## 2017-12-26 ASSESSMENT — PAIN SCALES - GENERAL
PAINLEVEL_OUTOF10: 0
PAINLEVEL_OUTOF10: 2
PAINLEVEL_OUTOF10: 0
PAINLEVEL_OUTOF10: 2
PAINLEVEL_OUTOF10: 5

## 2017-12-26 ASSESSMENT — PATIENT HEALTH QUESTIONNAIRE - PHQ9
SUM OF ALL RESPONSES TO PHQ QUESTIONS 1-9: 0
SUM OF ALL RESPONSES TO PHQ9 QUESTIONS 1 AND 2: 0
1. LITTLE INTEREST OR PLEASURE IN DOING THINGS: NOT AT ALL

## 2017-12-26 NOTE — PROGRESS NOTES
Report received, pt care assumed. Denies pain, bonding with baby. Educated on BF, skin to skin reinforced, BF brochure given and reviewed w/pt. All needs met at this time.

## 2017-12-26 NOTE — CONSULTS
Mother reports that she is breastfeeding her  without difficulty or discomfort.She had help with waking the baby and latching last night and much improved this morning. Resources for out-patient support discussed.

## 2017-12-26 NOTE — DISCHARGE PLANNING
Medical Social Work    Infant: Bushra Vazquez     SW met with MOB at bedside to complete assessment. SW provided MOB with children and family resource list and pediatrician list. MOB reported that she has tried marijuana once and it was not for her. MOB and infant's drug screen came back negative. MOB reports that she received no prenatal care because she was initially going to terminate the pregnancy. Once she decided to keep the baby, she moved to [place] to help care for her grandmother. She reports she has recently experienced a lot of death, including her own brother.    MOB and infant cleared by  for discharge.      Plan:  SW to follow and assist as needed.     Discharge Planning Assessment Post Partum     Reason for Referral: No prenatal care and hx of marijuana use  Address: [address] [place] [address], [place] [address]  Type of Living Situation: Lives with sister and older daughter  Mom Diagnosis: Pregnancy  Baby Diagnosis:   Primary Language: English     Father of the Baby: Marquise Vazquez  Involved in baby’s care? Yes  Contact Information: [phone]  FOB'S : 1992     Prenatal Care: None  Mom's PCP: No PCP  PCP for new baby: Is thinking of going to [facility]     Support System: FOB, family including siblings, grandma and aunt  Source of Feeding: Breastfeed  Supplies for Infant: Car seat, diapers. Still needs diapers     Mom's Insurance: [employer]  Baby Covered on Insurance: Not yet  Mother Employed/School: No  Other children in the home/names & ages: Kaylianua (3)     Financial Hardship/Income: Unemployed  Mom's Mental status: A&Ox4  Services used prior to admit: Low income housing, food stamps ($387)     CPS History: Denies  Psychiatric History: Denies  Domestic Violence History: Denies  Drug/ETOH History: Reports that she has tried marijuana once and it was not for her.      Resources Provided: Children and family resource list, pediatrician list  Referrals Made: None

## 2017-12-26 NOTE — CONSULTS
Mother latching baby and reports that feeding at breast is going well. Latch improved overnight. Resources post discharge discussed.

## 2017-12-26 NOTE — PROGRESS NOTES
Discharge teaching reviewed with patient, all questions answered. Pt given all written information on self and infant care, including prescriptions and follow-up instructions.

## 2017-12-26 NOTE — DISCHARGE INSTRUCTIONS
POSTPARTUM DISCHARGE INSTRUCTIONS FOR MOM    YOB: 1995   Age: 22 y.o.               Admit Date: 2017     Discharge Date: 2017  Attending Doctor:  Sherri Arce M.D.                  Allergies:  Patient has no known allergies.    Discharged to home by car. Discharged via wheelchair, hospital escort: Yes.  Special equipment needed: Not Applicable  Belongings with: Personal  Be sure to schedule a follow-up appointment with your primary care doctor or any specialists as instructed.     Discharge Plan:   Diet Plan: Discussed  Activity Level: Discussed  Confirmed Follow up Appointment: Appointment Scheduled  Confirmed Symptoms Management: Discussed  Medication Reconciliation Updated: Yes  Influenza Vaccine Indication: Patient Refuses    REASONS TO CALL YOUR OBSTETRICIAN:  1.   Persistent fever or shaking chills (Temperature higher than 100.4)  2.   Heavy bleeding (soaking more than 1 pad per hour); Passing clots  3.   Foul odor from vagina  4.   Mastitis (Breast infection; breast pain, chills, fever, redness)  5.   Urinary pain, burning or frequency  6.   Episiotomy infection  7.   Abdominal incision infection  8.   Severe depression longer than 24 hours    HAND WASHING  · Prior to handling the baby.  · Before breastfeeding or bottle feeding baby.  · After using the bathroom or changing the baby's diaper.    WOUND CARE  Ask your physician for additional care instructions.  In general:    ·  Incision:      · Keep clean and dry.    · Do NOT lift anything heavier than your baby for up to 6 weeks.    · There should not be any opening or pus.      VAGINAL CARE  · Nothing inside vagina for 6 weeks: no sexual intercourse, tampons or douching.  · Bleeding may continue for 2-4 weeks.  Amount may vary.    · Call your physician for heavy bleeding which means soaking more than 1 pad per hour    BIRTH CONTROL  · It is possible to become pregnant at any time after delivery and while  "breastfeeding.  · Plan to discuss a method of birth control with your physician at your follow up visit. visit.    DIET AND ELIMINATION  · Eating more fiber (bran cereal, fruits, and vegetables) and drinking plenty of fluids will help to avoid constipation.  · Urinary frequency after childbirth is normal.    POSTPARTUM BLUES  During the first few days after birth, you may experience a sense of the \"blues\" which may include impatience, irritability or even crying.  These feeling come and go quickly.  However, as many as 1 in 10 women experience emotional symptoms known as postpartum depression.    Postpartum depression:  May start as early as the second or third day after delivery or take several weeks or months to develop.  Symptoms of \"blues\" are present, but are more intense:  Crying spells; loss of appetite; feelings of hopelessness or loss of control; fear of touching the baby; over concern or no concern at all about the baby; little or no concern about your own appearance/caring for yourself; and/or inability to sleep or excessive sleeping.  Contact your physician if you are experiencing any of these symptoms.    Crisis Hotline:  · [place] Crisis Hotline:  2-689-XJKBMKD  Or 1-811.390.9294  · [place] Crisis Hotline:  1-558.324.4398  Or 474-324-6092    PREVENTING SHAKEN BABY:  If you are angry or stressed, PUT THE BABY IN THE CRIB, step away, take some deep breaths, and wait until you are calm to care for the baby.  DO NOT SHAKE THE BABY.  You are not alone, call a supporter for help.    · Crisis Call Center 24/7 crisis line 950-758-7361 or 1-504.200.8616  · You can also text them, text \"ANSWER\" to 295496    QUIT SMOKING/TOBACCO USE:  I understand the use of any tobacco products increases my chance of suffering from future heart disease and could cause other illnesses which may shorten my life. Quitting the use of tobacco products is the single most important thing I can do to improve my health. For further " information on smoking / tobacco cessation call a Toll Free Quit Line at 1-590.744.1999 (*National [employer] [place]) or 1-938.910.6185 (American Lung Association) or you can access the web based program at www.lungusa.org.    · [place] Tobacco Users Help Line:  (803) 447-7505       Toll Free: 1-753.734.8834  · Quit Tobacco Program [facility] (479)265-5210    DEPRESSION / SUICIDE RISK:  As you are discharged from this [facility], it is important to learn how to keep safe from harming yourself.    Recognize the warning signs:  · Abrupt changes in personality, positive or negative- including increase in energy   · Giving away possessions  · Change in eating patterns- significant weight changes-  positive or negative  · Change in sleeping patterns- unable to sleep or sleeping all the time   · Unwillingness or inability to communicate  · Depression  · Unusual sadness, discouragement and loneliness  · Talk of wanting to die  · Neglect of personal appearance   · Rebelliousness- reckless behavior  · Withdrawal from people/activities they love  · Confusion- inability to concentrate     If you or a loved one observes any of these behaviors or has concerns about self-harm, here's what you can do:  · Talk about it- your feelings and reasons for harming yourself  · Remove any means that you might use to hurt yourself (examples: pills, rope, extension cords, firearm)  · Get professional help from the community (Mental Health, Substance Abuse, psychological counseling)  · Do not be alone:Call your Safe Contact- someone whom you trust who will be there for you.  · Call your local CRISIS HOTLINE 106-4886 or 628-440-7921  · Call your local Children's Mobile Crisis Response Team [place] (970) 021-2507 or www.LoveLive.TV  · Call the toll free National Suicide Prevention Hotlines   · National Suicide Prevention Lifeline 593-213-AHHI (3102)  · National Hope Line Network 800-SUICIDE  (964-5273)    DISCHARGE SURVEY:  Thank you for choosing [facility].  We hope we provided you with very good care.  You may be receiving a survey in the mail.  Please fill it out.  Your opinion is valuable to us.    ADDITIONAL EDUCATIONAL MATERIALS GIVEN TO PATIENT:        My signature on this form indicates that:  1.  I have reviewed and understand the above information  2.  My questions regarding this information have been answered to my satisfaction.  3.  I have formulated a plan with my discharge nurse to obtain my prescribed medication for home.

## 2017-12-26 NOTE — CARE PLAN
Problem: Safety  Goal: Will remain free from falls  Pt is up and steady.     Problem: Knowledge Deficit  Goal: Knowledge of disease process/condition, treatment plan, diagnostic tests, and medications will improve  Education re BF given, brochure reviewed w/pt and skin to skin reinforced.   Intervention: Assess knowledge level of disease process/condition, treatment plan, diagnostic tests, and medications  done

## 2017-12-26 NOTE — DISCHARGE SUMMARY
POSTPARTUM    PROGRESS  NOTE;    PATIENT ID:  NAME:  Wade Bergeron  MRN:               [id number]  YOB: 1995     22 y.o. female  at Unknown PPD#2 s/p     Subjective: doing well    Objective:    Vitals:    17 0000 17 0400 17 0800 17   BP: 115/69 125/72 101/68 110/70   Pulse: 89 90 69 65   Resp:    Temp: 36.7 °C (98 °F) 36.8 °C (98.2 °F) 36.7 °C (98.1 °F) 37 °C (98.6 °F)   SpO2: 95% 93% 97% 96%   Weight:       Height:         General: No acute distress, resting comfortably in bed.  HEENT: normocephalic, nontraumatic, PERRLA, EOMI  Cardiovascular: Heart RRR with no murmurs, rubs or gallops. Distal Pulses 2+  Respiratory: symmetric chest expansion, lungs CTA bilaterally with no wheezes rales or rhonci  Abdomen: soft, mildly tender, fundus firm, +BS  Genitourinary: lochia light, denies excessive vaginal bleeding  Musculoskeletal: strength 5/5 in four extremities  Neuro: non focal with no numbness, tingling or changes in sensation    Recent Labs      17   1430  17   0220   WBC  8.6  12.1*   RBC  4.30  3.69*   HEMOGLOBIN  13.0  11.1*   HEMATOCRIT  38.5  33.1*   MCV  89.5  89.7   MCH  30.2  30.1   RDW  43.4  42.4   PLATELETCT  171  164   MPV  11.0  11.4   NEUTSPOLYS  75.40*   --    LYMPHOCYTES  11.10*   --    MONOCYTES  12.40   --    EOSINOPHILS  0.10   --    BASOPHILS  0.30   --      No results for input(s): SODIUM, POTASSIUM, CHLORIDE, CO2, GLUCOSE, BUN, CPKTOTAL in the last 72 hours.    Current Meds:   Current Facility-Administered Medications   Medication Dose Frequency Provider Last Rate Last Dose   • ondansetron (ZOFRAN ODT) dispertab 4 mg  4 mg Q6HRS PRN Evelyn Morales M.D.        Or   • ondansetron (ZOFRAN) syringe/vial injection 4 mg  4 mg Q6HRS PRN Evelyn Morales M.D.       • oxytocin (PITOCIN) infusion (for postpartum)   mL/hr Continuous Evelyn Morales M.D.   Stopped at 17   • ibuprofen (MOTRIN) tablet 600 mg  600 mg  Q6HRS PRN Evelyn Morales M.D.   600 mg at 17 1649   • acetaminophen (TYLENOL) tablet 325 mg  325 mg Q4HRS PRN Evelyn Morales M.D.       • oxycodone-acetaminophen (PERCOCET) 5-325 MG per tablet 1 Tab  1 Tab Q4HRS PRN Evelyn Morales M.D.       • oxycodone-acetaminophen (PERCOCET-10)  MG per tablet 1 Tab  1 Tab Q4HRS PRN Evelyn Morales M.D.   1 Tab at 17   • LR infusion   PRN Laureen Jimenez, A.P.R.N.       • PRN oxytocin (PITOCIN) (20 Units/1000 mL) PRN for excessive uterine bleeding - See Admin Instr  125-999 mL/hr Once PRN Laureen Jimenez, A.P.R.N.       • misoprostol (CYTOTEC) tablet 600 mcg  600 mcg Once PRN Laureen Jimenez, A.P.R.N.       • methylergonovine (METHERGINE) injection 0.2 mg  0.2 mg Once PRN Laureen Jimenez, A.P.R.N.       • carboPROST (HEMABATE) injection 250 mcg  250 mcg Once PRN Laureen Jimenez, A.P.R.N.       • docusate sodium (COLACE) capsule 100 mg  100 mg BID PRN Laureen Jimenez, A.P.R.N.       • bisacodyl (DULCOLAX) suppository 10 mg  10 mg PRN Laureen Jimenez, A.P.R.N.       • glycerin (adult) suppository 1 Suppository  1 Suppository QDAY PRN Laureen Jimenez, A.P.R.N.       • magnesium hydroxide (MILK OF MAGNESIA) suspension 30 mL  30 mL Q6HRS PRN Laureen Jimenez, A.P.R.N.       • prenatal plus vitamin (STUARTNATAL 1+1) 27-1 MG tablet 1 Tab  1 Tab QAM Laureen Jimenez, A.P.R.N.   1 Tab at 17 0939     Last reviewed on 2017  2:24 PM by Carmen English R.N.     Assessment:  22 y.o. female  at Unknown PPD#2 s/p     Plan:   1. home  2. Disposition: FU 5 weeks TPC      Michele Briscoe MD

## 2019-06-10 ENCOUNTER — HOSPITAL ENCOUNTER (INPATIENT)
Facility: MEDICAL CENTER | Age: 24
LOS: 2 days | End: 2019-06-12
Attending: SPECIALIST | Admitting: OBSTETRICS & GYNECOLOGY
Payer: MEDICAID

## 2019-06-10 ENCOUNTER — ANESTHESIA (OUTPATIENT)
Dept: OBGYN | Facility: MEDICAL CENTER | Age: 24
End: 2019-06-10
Payer: MEDICAID

## 2019-06-10 ENCOUNTER — ANESTHESIA EVENT (OUTPATIENT)
Dept: OBGYN | Facility: MEDICAL CENTER | Age: 24
End: 2019-06-10
Payer: MEDICAID

## 2019-06-10 DIAGNOSIS — R10.2 PELVIC PAIN: Primary | ICD-10-CM

## 2019-06-10 LAB
ABO GROUP BLD: NORMAL
BASOPHILS # BLD AUTO: 0.2 % (ref 0–1.8)
BASOPHILS # BLD: 0.02 K/UL (ref 0–0.12)
BLD GP AB SCN SERPL QL: NORMAL
EOSINOPHIL # BLD AUTO: 0.05 K/UL (ref 0–0.51)
EOSINOPHIL NFR BLD: 0.6 % (ref 0–6.9)
ERYTHROCYTE [DISTWIDTH] IN BLOOD BY AUTOMATED COUNT: 44 FL (ref 35.9–50)
HBV SURFACE AG SER QL: NEGATIVE
HCT VFR BLD AUTO: 38.5 % (ref 37–47)
HCV AB SER QL: NEGATIVE
HGB BLD-MCNC: 12.9 G/DL (ref 12–16)
HIV 1+2 AB+HIV1 P24 AG SERPL QL IA: NON REACTIVE
IMM GRANULOCYTES # BLD AUTO: 0.04 K/UL (ref 0–0.11)
IMM GRANULOCYTES NFR BLD AUTO: 0.5 % (ref 0–0.9)
LYMPHOCYTES # BLD AUTO: 2.02 K/UL (ref 1–4.8)
LYMPHOCYTES NFR BLD: 23.5 % (ref 22–41)
MCH RBC QN AUTO: 31.2 PG (ref 27–33)
MCHC RBC AUTO-ENTMCNC: 33.5 G/DL (ref 33.6–35)
MCV RBC AUTO: 93 FL (ref 81.4–97.8)
MONOCYTES # BLD AUTO: 0.75 K/UL (ref 0–0.85)
MONOCYTES NFR BLD AUTO: 8.7 % (ref 0–13.4)
NEUTROPHILS # BLD AUTO: 5.7 K/UL (ref 2–7.15)
NEUTROPHILS NFR BLD: 66.5 % (ref 44–72)
NRBC # BLD AUTO: 0 K/UL
NRBC BLD-RTO: 0 /100 WBC
PLATELET # BLD AUTO: 160 K/UL (ref 164–446)
PMV BLD AUTO: 11.1 FL (ref 9–12.9)
RBC # BLD AUTO: 4.14 M/UL (ref 4.2–5.4)
RH BLD: NORMAL
RUBV AB SER QL: 99.2 IU/ML
TREPONEMA PALLIDUM IGG+IGM AB [PRESENCE] IN SERUM OR PLASMA BY IMMUNOASSAY: NON REACTIVE
WBC # BLD AUTO: 8.6 K/UL (ref 4.8–10.8)

## 2019-06-10 PROCEDURE — 770002 HCHG ROOM/CARE - OB PRIVATE (112)

## 2019-06-10 PROCEDURE — A9270 NON-COVERED ITEM OR SERVICE: HCPCS | Performed by: SPECIALIST

## 2019-06-10 PROCEDURE — 303615 HCHG EPIDURAL/SPINAL ANESTHESIA FOR LABOR

## 2019-06-10 PROCEDURE — 86901 BLOOD TYPING SEROLOGIC RH(D): CPT

## 2019-06-10 PROCEDURE — 700105 HCHG RX REV CODE 258: Performed by: ANESTHESIOLOGY

## 2019-06-10 PROCEDURE — 700111 HCHG RX REV CODE 636 W/ 250 OVERRIDE (IP): Performed by: SPECIALIST

## 2019-06-10 PROCEDURE — A9270 NON-COVERED ITEM OR SERVICE: HCPCS | Performed by: OBSTETRICS & GYNECOLOGY

## 2019-06-10 PROCEDURE — 304965 HCHG RECOVERY SERVICES

## 2019-06-10 PROCEDURE — 36415 COLL VENOUS BLD VENIPUNCTURE: CPT

## 2019-06-10 PROCEDURE — 700102 HCHG RX REV CODE 250 W/ 637 OVERRIDE(OP): Performed by: OBSTETRICS & GYNECOLOGY

## 2019-06-10 PROCEDURE — 700111 HCHG RX REV CODE 636 W/ 250 OVERRIDE (IP): Performed by: OBSTETRICS & GYNECOLOGY

## 2019-06-10 PROCEDURE — 85025 COMPLETE CBC W/AUTO DIFF WBC: CPT

## 2019-06-10 PROCEDURE — 700111 HCHG RX REV CODE 636 W/ 250 OVERRIDE (IP): Performed by: ANESTHESIOLOGY

## 2019-06-10 PROCEDURE — 87340 HEPATITIS B SURFACE AG IA: CPT

## 2019-06-10 PROCEDURE — 86900 BLOOD TYPING SEROLOGIC ABO: CPT

## 2019-06-10 PROCEDURE — 86780 TREPONEMA PALLIDUM: CPT

## 2019-06-10 PROCEDURE — 87389 HIV-1 AG W/HIV-1&-2 AB AG IA: CPT

## 2019-06-10 PROCEDURE — 86803 HEPATITIS C AB TEST: CPT

## 2019-06-10 PROCEDURE — 700105 HCHG RX REV CODE 258: Performed by: SPECIALIST

## 2019-06-10 PROCEDURE — 700102 HCHG RX REV CODE 250 W/ 637 OVERRIDE(OP): Performed by: SPECIALIST

## 2019-06-10 PROCEDURE — 59409 OBSTETRICAL CARE: CPT

## 2019-06-10 PROCEDURE — 86762 RUBELLA ANTIBODY: CPT

## 2019-06-10 PROCEDURE — 86850 RBC ANTIBODY SCREEN: CPT

## 2019-06-10 RX ORDER — SODIUM CHLORIDE, SODIUM LACTATE, POTASSIUM CHLORIDE, CALCIUM CHLORIDE 600; 310; 30; 20 MG/100ML; MG/100ML; MG/100ML; MG/100ML
INJECTION, SOLUTION INTRAVENOUS CONTINUOUS
Status: DISCONTINUED | OUTPATIENT
Start: 2019-06-10 | End: 2019-06-12 | Stop reason: HOSPADM

## 2019-06-10 RX ORDER — SODIUM CHLORIDE, SODIUM LACTATE, POTASSIUM CHLORIDE, AND CALCIUM CHLORIDE .6; .31; .03; .02 G/100ML; G/100ML; G/100ML; G/100ML
250 INJECTION, SOLUTION INTRAVENOUS PRN
Status: DISCONTINUED | OUTPATIENT
Start: 2019-06-10 | End: 2019-06-10 | Stop reason: HOSPADM

## 2019-06-10 RX ORDER — SODIUM CHLORIDE, SODIUM LACTATE, POTASSIUM CHLORIDE, CALCIUM CHLORIDE 600; 310; 30; 20 MG/100ML; MG/100ML; MG/100ML; MG/100ML
INJECTION, SOLUTION INTRAVENOUS
Status: ACTIVE
Start: 2019-06-10 | End: 2019-06-10

## 2019-06-10 RX ORDER — OXYCODONE AND ACETAMINOPHEN 10; 325 MG/1; MG/1
1 TABLET ORAL EVERY 4 HOURS PRN
Status: DISCONTINUED | OUTPATIENT
Start: 2019-06-10 | End: 2019-06-12 | Stop reason: HOSPADM

## 2019-06-10 RX ORDER — SODIUM CHLORIDE, SODIUM LACTATE, POTASSIUM CHLORIDE, AND CALCIUM CHLORIDE .6; .31; .03; .02 G/100ML; G/100ML; G/100ML; G/100ML
1000 INJECTION, SOLUTION INTRAVENOUS
Status: COMPLETED | OUTPATIENT
Start: 2019-06-10 | End: 2019-06-10

## 2019-06-10 RX ORDER — BUPIVACAINE HYDROCHLORIDE 2.5 MG/ML
INJECTION, SOLUTION EPIDURAL; INFILTRATION; INTRACAUDAL
Status: COMPLETED
Start: 2019-06-10 | End: 2019-06-10

## 2019-06-10 RX ORDER — VITAMIN A ACETATE, BETA CAROTENE, ASCORBIC ACID, CHOLECALCIFEROL, .ALPHA.-TOCOPHEROL ACETATE, DL-, THIAMINE MONONITRATE, RIBOFLAVIN, NIACINAMIDE, PYRIDOXINE HYDROCHLORIDE, FOLIC ACID, CYANOCOBALAMIN, CALCIUM CARBONATE, FERROUS FUMARATE, ZINC OXIDE, CUPRIC OXIDE 3080; 12; 120; 400; 1; 1.84; 3; 20; 22; 920; 25; 200; 27; 10; 2 [IU]/1; UG/1; MG/1; [IU]/1; MG/1; MG/1; MG/1; MG/1; MG/1; [IU]/1; MG/1; MG/1; MG/1; MG/1; MG/1
1 TABLET, FILM COATED ORAL EVERY MORNING
Status: DISCONTINUED | OUTPATIENT
Start: 2019-06-11 | End: 2019-06-12 | Stop reason: HOSPADM

## 2019-06-10 RX ORDER — BUPIVACAINE HYDROCHLORIDE 2.5 MG/ML
INJECTION, SOLUTION EPIDURAL; INFILTRATION; INTRACAUDAL PRN
Status: DISCONTINUED | OUTPATIENT
Start: 2019-06-10 | End: 2019-06-10 | Stop reason: SURG

## 2019-06-10 RX ORDER — OXYCODONE HYDROCHLORIDE AND ACETAMINOPHEN 5; 325 MG/1; MG/1
1 TABLET ORAL EVERY 4 HOURS PRN
Status: DISCONTINUED | OUTPATIENT
Start: 2019-06-10 | End: 2019-06-12 | Stop reason: HOSPADM

## 2019-06-10 RX ORDER — MISOPROSTOL 200 UG/1
800 TABLET ORAL
Status: DISCONTINUED | OUTPATIENT
Start: 2019-06-10 | End: 2019-06-12 | Stop reason: HOSPADM

## 2019-06-10 RX ORDER — ROPIVACAINE HYDROCHLORIDE 2 MG/ML
INJECTION, SOLUTION EPIDURAL; INFILTRATION; PERINEURAL CONTINUOUS
Status: DISCONTINUED | OUTPATIENT
Start: 2019-06-10 | End: 2019-06-12 | Stop reason: HOSPADM

## 2019-06-10 RX ORDER — ROPIVACAINE HYDROCHLORIDE 2 MG/ML
INJECTION, SOLUTION EPIDURAL; INFILTRATION; PERINEURAL
Status: ACTIVE
Start: 2019-06-10 | End: 2019-06-10

## 2019-06-10 RX ORDER — SODIUM CHLORIDE, SODIUM LACTATE, POTASSIUM CHLORIDE, CALCIUM CHLORIDE 600; 310; 30; 20 MG/100ML; MG/100ML; MG/100ML; MG/100ML
INJECTION, SOLUTION INTRAVENOUS PRN
Status: DISCONTINUED | OUTPATIENT
Start: 2019-06-10 | End: 2019-06-12 | Stop reason: HOSPADM

## 2019-06-10 RX ORDER — IBUPROFEN 600 MG/1
600 TABLET ORAL EVERY 6 HOURS PRN
Status: DISCONTINUED | OUTPATIENT
Start: 2019-06-10 | End: 2019-06-12 | Stop reason: HOSPADM

## 2019-06-10 RX ORDER — ONDANSETRON 4 MG/1
4 TABLET, ORALLY DISINTEGRATING ORAL EVERY 6 HOURS PRN
Status: DISCONTINUED | OUTPATIENT
Start: 2019-06-10 | End: 2019-06-12 | Stop reason: HOSPADM

## 2019-06-10 RX ORDER — DOCUSATE SODIUM 100 MG/1
100 CAPSULE, LIQUID FILLED ORAL 2 TIMES DAILY PRN
Status: DISCONTINUED | OUTPATIENT
Start: 2019-06-10 | End: 2019-06-12 | Stop reason: HOSPADM

## 2019-06-10 RX ORDER — ONDANSETRON 2 MG/ML
4 INJECTION INTRAMUSCULAR; INTRAVENOUS EVERY 6 HOURS PRN
Status: DISCONTINUED | OUTPATIENT
Start: 2019-06-10 | End: 2019-06-12 | Stop reason: HOSPADM

## 2019-06-10 RX ADMIN — BUPIVACAINE HYDROCHLORIDE 8 ML: 2.5 INJECTION, SOLUTION EPIDURAL; INFILTRATION; INTRACAUDAL; PERINEURAL at 11:42

## 2019-06-10 RX ADMIN — FENTANYL CITRATE 100 MCG: 50 INJECTION INTRAMUSCULAR; INTRAVENOUS at 10:26

## 2019-06-10 RX ADMIN — Medication 2000 ML/HR: at 14:14

## 2019-06-10 RX ADMIN — SODIUM CHLORIDE, POTASSIUM CHLORIDE, SODIUM LACTATE AND CALCIUM CHLORIDE 1000 ML: 600; 310; 30; 20 INJECTION, SOLUTION INTRAVENOUS at 08:38

## 2019-06-10 RX ADMIN — OXYCODONE HYDROCHLORIDE AND ACETAMINOPHEN 1 TABLET: 10; 325 TABLET ORAL at 20:14

## 2019-06-10 RX ADMIN — ROPIVACAINE HYDROCHLORIDE: 2 INJECTION, SOLUTION EPIDURAL; INFILTRATION at 11:46

## 2019-06-10 RX ADMIN — Medication 1 MILLI-UNITS/MIN: at 08:38

## 2019-06-10 RX ADMIN — SODIUM CHLORIDE, POTASSIUM CHLORIDE, SODIUM LACTATE AND CALCIUM CHLORIDE 1000 ML: 600; 310; 30; 20 INJECTION, SOLUTION INTRAVENOUS at 11:59

## 2019-06-10 RX ADMIN — Medication 125 ML/HR: at 15:30

## 2019-06-10 RX ADMIN — IBUPROFEN 600 MG: 600 TABLET ORAL at 15:28

## 2019-06-10 RX ADMIN — FENTANYL CITRATE 100 MCG: 50 INJECTION INTRAMUSCULAR; INTRAVENOUS at 08:39

## 2019-06-10 RX ADMIN — FENTANYL CITRATE 100 MCG: 50 INJECTION, SOLUTION INTRAMUSCULAR; INTRAVENOUS at 11:42

## 2019-06-10 ASSESSMENT — PATIENT HEALTH QUESTIONNAIRE - PHQ9
2. FEELING DOWN, DEPRESSED, IRRITABLE, OR HOPELESS: NOT AT ALL
SUM OF ALL RESPONSES TO PHQ9 QUESTIONS 1 AND 2: 0
1. LITTLE INTEREST OR PLEASURE IN DOING THINGS: NOT AT ALL

## 2019-06-10 ASSESSMENT — PAIN SCALES - GENERAL: PAIN_LEVEL: 2

## 2019-06-10 ASSESSMENT — LIFESTYLE VARIABLES
ALCOHOL_USE: NO
EVER_SMOKED: YES

## 2019-06-10 NOTE — PROGRESS NOTES
"0700 - Report received from Jasmine JOSÉ RN at , care assumed. Morris Gestation - 38.1 Weeks      Patient is in bed awake - working through contractions with excellent control.   FOB \"Gordo\" at  - patient is expecting her brother and sister in-law - uncertain if they will be in the room at delivery. Patient is ok with the 2 staying in the room for delivery if they want.   Patient would like the FOB at  during the pushing and delivery phase.    Patient would like immediate skin/skin at delivery.   Once the cord has stopped pulsating, FOB would like to cut the cord.   Bottle only, similac    Reports little sleep last night, reports contractions are increasing in intensity, denies ill feeling, reports FM, SROM today on arrival to the hospital/clear - RN notes clear fluid without blood with linen change. Denies change to vision/edema/HA; states she has been getting up to the BR herself without assist, denies dizziness or weakness.     Use of FM/[place] discussed and in place, discussed POC, pleasant/sleepy affect/mood. Review of labor process/expectations, breathing/relaxation techniques - TBD as needed, currently working through contractions with effective breathing/relaxation technique. Discussed pain management options - patient reports she intends to have a non pain intervention delivery although states she may request IV medication. States she is not interested in an epidural Patient/FOB deny questions/concerns regarding care since arrival to [facility].   RN contact information updated on the dry erase board, discussion.   Patient encouraged to call RN with all questions/concerns/needs.     0730 - Dr. Cook called in for an update on patient status and to announce plan for Dr. Holden to be called for needs/delivery before 1700. POC discussed with patient.     1300 - Call from  relaying a request from CPS for a drug test on the NB.     1412 -  of female by Dr. Holden, Marium DAMON RN at  " to care for NB. FOB at . NB placed to mothers abdomen at delivery as requested. Due to tight nuchal cord, FOB was not able to cut the cord as requested. Physician clamped and cut the tight nuchal prior to delivery.     1530 - Patient is unable to move her legs freely, reports some movement of toes and feet, unable to bend either leg. Reports legs feel heavy. Denies pain or discomfort. Family at .     1600 - Food tray at . Patient is sitting up holding NB, eager to eat food. Will assess mobility in 30 minutes. Patient denies needs at this time.     1630 - Reports she can move her legs more and they are starting to feel normal although still very heavy and tingling. Will reassess for move to PPU in 30 minutes.

## 2019-06-10 NOTE — PROGRESS NOTES
0528:  Pt presents today complaining of UC's that started around 0430 this morning. While changing Pt SROM'd clear fluid at approximately 0520. She denies any VB and reports good FM. EFM and TOCO applied. Large amount of clear fluid noted during SVE. SVE 2-3/70/-2. POC discussed with Pt and FOB, and questions answered. Pt has had late and limited PNC with her last visit to Dr. Cook being in April. Pt states she had problems with her insurance and was working through them when she began laboring today.     0540: Call to Dr. Unger for report. Admit orders received.     0700: Report to Abraham TERRY.

## 2019-06-10 NOTE — H&P
History and Physical      Wade Bergeron is a 24 y.o. female   who presents for active labor     Subjective:   positive  For CTXS.   negative Feels pain   positive for LOF  negative for vaginal bleeding.   positive for fetal movement    ROS: A comprehensive review of systems was negative.    No past medical history on file.  No past surgical history on file.  Family History   Problem Relation Age of Onset   • Cancer Mother         lung cancer     OB History    Para Term  AB Living   4 1     1     SAB TAB Ectopic Molar Multiple Live Births   1                # Outcome Date GA Lbr Harvey/2nd Weight Sex Delivery Anes PTL Lv   4 Current            3 SAB            2 Para            1                  Social History     Social History   • Marital status: Single     Spouse name: N/A   • Number of children: N/A   • Years of education: N/A     Occupational History   • Not on file.     Social History Main Topics   • Smoking status: Never Smoker   • Smokeless tobacco: Never Used      Comment: Quit 2013   • Alcohol use No   • Drug use: Yes      Comment: stopped marajuana one year ago.    • Sexual activity: Not on file     Other Topics Concern   • Not on file     Social History Narrative   • No narrative on file     Allergies: Patient has no known allergies.    Current Facility-Administered Medications:   •  fentaNYL (SUBLIMAZE) injection 50 mcg, 50 mcg, Intravenous, Q HOUR PRN, Gilson Unger M.D.  •  fentaNYL (SUBLIMAZE) injection 100 mcg, 100 mcg, Intravenous, Q HOUR PRN, Gilson Unger M.D., 100 mcg at 06/10/19 1026  •  oxytocin (PITOCIN) 20 UNITS/1000ML LR, , , ,   •  LACTATED RINGERS IV SOLN, , , ,   •  oxytocin (PITOCIN) 20 UNITS/1000ML LR (induction of labor), 0.5-20 milla-units/min, Intravenous, Continuous, Jefferson Cook M.D., Last Rate: 18 mL/hr at 06/10/19 1045, 6 milla-units/min at 06/10/19 1045  •  lactated ringers infusion, , Intravenous, Continuous, Jefferson Cook M.D.,  "Last Rate: 125 mL/hr at 06/10/19 0838, 1,000 mL at 06/10/19 0838  •  ropivacaine (NAROPIN) injection, , Epidural, Continuous, Tobey Gansert, M.D.  •  ePHEDrine injection 5 mg, 5 mg, Intravenous, Q5 MIN PRN **AND** Notify Anesthesiologist if ephedrine given and for sustained hypotension (more than 2 minutes), , , Once **AND** For Hypotension: Place patient in left lateral tilt to achieve uterine displacement and elevate legs., , , PRN **AND** Hypotension: Oxygen Continuous, , , CONTINUOUS **AND** lactated ringers infusion (BOLUS), 250 mL, Intravenous, PRN, Tobey Gansert, M.D.  •  ROPIVACAINE HCL 2 MG/ML INJ SOLN, , , ,     Facility-Administered Medications Ordered in Other Encounters:   •  fentaNYL (SUBLIMAZE) injection, , , PRN, Tobey Gansert, M.D., 100 mcg at 06/10/19 1142  •  bupivacaine 0.25% (SENSORCAINE-MARCAINE) pf injection, , , PRN, Tobey Gansert, M.D., 8 mL at 06/10/19 1142    Prenatal care with Dr Cook   Patient Active Problem List    Diagnosis Date Noted   • Labor and delivery, indication for care 05/26/2014   • Supervision of normal first pregnancy 01/07/2014   • Late prenatal care @ 22 wks  01/07/2014     Objective:      /65   Pulse 70   Temp 37.1 °C (98.7 °F) (Temporal)   Resp 16   Ht 1.575 m (5' 2\")   Wt 59 kg (130 lb)   SpO2 99%     General:   no acute distress, alert, cooperative   Skin:   normal   HEENT:  Sclera clear, anicteric   Lungs:   CTA bilateral   Heart:   S1, S2 normal, no murmur, click, rub or gallop, regular rate and rhythm, no hepatosplenomegaly   Abdomen:   gravid, NT   EFW:  3200   Pelvis:  adequate with gynecoid pelvis   FHT:  150 BPM   Uterine Size: S=D   Presentations: Cephalic   Cervix:     Dilation: complete    Effacement: Complete     Station:  +1               Lab Review  Lab:   Blood type: O     Recent Results (from the past 5880 hour(s))   Prenatal Panel 3+HIV+HCV    Collection Time: 06/10/19  6:10 AM   Result Value Ref Range    WBC 8.6 4.8 - 10.8 K/uL    RBC " 4.14 (L) 4.20 - 5.40 M/uL    Hemoglobin 12.9 12.0 - 16.0 g/dL    Hematocrit 38.5 37.0 - 47.0 %    MCV 93.0 81.4 - 97.8 fL    MCH 31.2 27.0 - 33.0 pg    MCHC 33.5 (L) 33.6 - 35.0 g/dL    RDW 44.0 35.9 - 50.0 fL    Platelet Count 160 (L) 164 - 446 K/uL    MPV 11.1 9.0 - 12.9 fL    Neutrophils-Polys 66.50 44.00 - 72.00 %    Lymphocytes 23.50 22.00 - 41.00 %    Monocytes 8.70 0.00 - 13.40 %    Eosinophils 0.60 0.00 - 6.90 %    Basophils 0.20 0.00 - 1.80 %    Immature Granulocytes 0.50 0.00 - 0.90 %    Nucleated RBC 0.00 /100 WBC    Neutrophils (Absolute) 5.70 2.00 - 7.15 K/uL    Lymphs (Absolute) 2.02 1.00 - 4.80 K/uL    Monos (Absolute) 0.75 0.00 - 0.85 K/uL    Eos (Absolute) 0.05 0.00 - 0.51 K/uL    Baso (Absolute) 0.02 0.00 - 0.12 K/uL    Immature Granulocytes (abs) 0.04 0.00 - 0.11 K/uL    NRBC (Absolute) 0.00 K/uL    Rubella IgG Antibody 99.20 IU/mL    Hepatitis B Surface Antigen Negative Negative    Hepatitis C Antibody Negative Negative    Syphilis, Treponemal Qual Non Reactive Non Reactive   OP PRENATAL PANEL-BLOOD BANK    Collection Time: 06/10/19  6:10 AM   Result Value Ref Range    ABO Grouping Only O     Rh Grouping Only POS     Antibody Screen Scrn NEG    HIV AG/AB COMBO ASSAY SCREENING    Collection Time: 06/10/19  7:07 AM   Result Value Ref Range    HIV Ag/Ab Combo Assay Non Reactive Non Reactive        Assessment:   Wade Bergeron at 38++ weeks  Active labor   Obstetrical history significant for   Patient Active Problem List    Diagnosis Date Noted   • Labor and delivery, indication for care 2014   • Supervision of normal first pregnancy 2014   • Late prenatal care @ 22 wks  2014   .      Plan:     Admit to L&D  GBS unknown - GBS prophylaxis  S/p epidural  Anticipate

## 2019-06-10 NOTE — ANESTHESIA PROCEDURE NOTES
Epidural Block  Performed by: GANSERT, TOBEY B  Authorized by: GANSERT, TOBEY B     Patient Location:  OB  Start Time:  6/10/2019 11:42 AM  End Time:  6/10/2019 11:55 AM  Reason for Block: labor analgesia    patient identified, IV checked, site marked, risks and benefits discussed, surgical consent, monitors and equipment checked, pre-op evaluation and timeout performed    Patient Position:  Sitting  Prep: ChloraPrep, patient draped and sterile technique    Monitoring:  Blood pressure, continuous pulse oximetry and heart rate  Approach:  Midline  Location:  L4-L5  Injection Technique:  KIRA saline  Skin infiltration:  Lidocaine  Strength:  1%  Dose:  3ml  Needle Type:  Tuohy  Needle Gauge:  17 G  Needle Length:  3.5 in  Loss of resistance::  6  Catheter Size:  19 G  Catheter at Skin Depth:  11  Test Dose:  Lidocaine 1.5% with epinephrine 1-to-200,000

## 2019-06-10 NOTE — CARE PLAN
Problem: Safety  Goal: Will remain free from injury  Outcome: PROGRESSING AS EXPECTED  Patient/Family instructed to call RN with any spills, cords in the way on the floor or any other safety hazards. Patient/Family also instructed to call RN with change to LOC, feelings of dizziness, blurry vision or any change to comfort or concern for safety.   Medication administered as ordered, verified with patient/scanned in to MAR and armband on patient. Medication administered as ordered to the right person, time and route. Waste of medication as needed verified with another RN as well as the administration dose prior to administration    Problem: Infection  Goal: Will remain free from infection  Outcome: PROGRESSING AS EXPECTED  Ongoing observation and assessment of signs/symptoms of potential infection. Patient/family aware of importance of handwashing and available foam on the wall for use as well.    Problem: Venous Thromboembolism (VTW)/Deep Vein Thrombosis (DVT) Prevention:  Goal: Patient will participate in Venous Thrombosis (VTE)/Deep Vein Thrombosis (DVT)Prevention Measures  Outcome: PROGRESSING AS EXPECTED  Ongoing discussion regarding importance of moving extremities frequently. Ambulating to the BR, walking to the sink and while in bed stretching legs/arms, moving feet and pointing toes toward the wall and the ceiling frequently.

## 2019-06-10 NOTE — L&D DELIVERY NOTE
DELIVERY NOTE: 6/10/2019    Admitting Diagnosis:  24 year old  38.1 weeks active labor.  GBS unknown, Late entry  No fever, followed CDC recommendation for GBS prophylaxis   Pitocin augmentation. She SROM'ed revealing clear AF  She progressed uneventfully in labor and delivered via  at 1412 over intact perineum under epidural anesthesia  to a LBF in OA  presentation with tight nuchal cord that was cut at the perineum   BW pending  AS .  Good suctioning of the nose and mouth was done, cord clamped and cut and baby  handed off to the RN in attendance of further care.   Placenta was delivered spontaneously 1414 complete and intact with 3 vessel cord.   Estimated Blood loss- 200 cc  Uterus noted to be firm and contracted immediately postpartum.  No other cervical nor vaginal lacerations noted.  Patient tolerated the procedure well. No complications encountered.  Both patient and baby are in good condition.

## 2019-06-11 LAB
ERYTHROCYTE [DISTWIDTH] IN BLOOD BY AUTOMATED COUNT: 44.7 FL (ref 35.9–50)
HCT VFR BLD AUTO: 33.2 % (ref 37–47)
HGB BLD-MCNC: 10.8 G/DL (ref 12–16)
MCH RBC QN AUTO: 30.7 PG (ref 27–33)
MCHC RBC AUTO-ENTMCNC: 32.5 G/DL (ref 33.6–35)
MCV RBC AUTO: 94.3 FL (ref 81.4–97.8)
PLATELET # BLD AUTO: 139 K/UL (ref 164–446)
PMV BLD AUTO: 11.1 FL (ref 9–12.9)
RBC # BLD AUTO: 3.52 M/UL (ref 4.2–5.4)
WBC # BLD AUTO: 10.5 K/UL (ref 4.8–10.8)

## 2019-06-11 PROCEDURE — 90471 IMMUNIZATION ADMIN: CPT

## 2019-06-11 PROCEDURE — 700102 HCHG RX REV CODE 250 W/ 637 OVERRIDE(OP): Performed by: SPECIALIST

## 2019-06-11 PROCEDURE — 36415 COLL VENOUS BLD VENIPUNCTURE: CPT

## 2019-06-11 PROCEDURE — 700102 HCHG RX REV CODE 250 W/ 637 OVERRIDE(OP): Performed by: OBSTETRICS & GYNECOLOGY

## 2019-06-11 PROCEDURE — 90715 TDAP VACCINE 7 YRS/> IM: CPT | Performed by: SPECIALIST

## 2019-06-11 PROCEDURE — A9270 NON-COVERED ITEM OR SERVICE: HCPCS | Performed by: OBSTETRICS & GYNECOLOGY

## 2019-06-11 PROCEDURE — A9270 NON-COVERED ITEM OR SERVICE: HCPCS | Performed by: SPECIALIST

## 2019-06-11 PROCEDURE — 85027 COMPLETE CBC AUTOMATED: CPT

## 2019-06-11 PROCEDURE — 3E0234Z INTRODUCTION OF SERUM, TOXOID AND VACCINE INTO MUSCLE, PERCUTANEOUS APPROACH: ICD-10-PCS | Performed by: OBSTETRICS & GYNECOLOGY

## 2019-06-11 PROCEDURE — 700112 HCHG RX REV CODE 229: Performed by: OBSTETRICS & GYNECOLOGY

## 2019-06-11 PROCEDURE — 770002 HCHG ROOM/CARE - OB PRIVATE (112)

## 2019-06-11 PROCEDURE — 700111 HCHG RX REV CODE 636 W/ 250 OVERRIDE (IP): Performed by: SPECIALIST

## 2019-06-11 RX ADMIN — IBUPROFEN 600 MG: 600 TABLET ORAL at 06:02

## 2019-06-11 RX ADMIN — OXYCODONE HYDROCHLORIDE AND ACETAMINOPHEN 1 TABLET: 10; 325 TABLET ORAL at 17:07

## 2019-06-11 RX ADMIN — IBUPROFEN 600 MG: 600 TABLET ORAL at 17:07

## 2019-06-11 RX ADMIN — DOCUSATE SODIUM 100 MG: 100 CAPSULE, LIQUID FILLED ORAL at 17:07

## 2019-06-11 RX ADMIN — Medication 1 TABLET: at 06:02

## 2019-06-11 RX ADMIN — OXYCODONE HYDROCHLORIDE AND ACETAMINOPHEN 1 TABLET: 10; 325 TABLET ORAL at 06:02

## 2019-06-11 RX ADMIN — TETANUS TOXOID, REDUCED DIPHTHERIA TOXOID AND ACELLULAR PERTUSSIS VACCINE, ADSORBED 0.5 ML: 5; 2.5; 8; 8; 2.5 SUSPENSION INTRAMUSCULAR at 04:12

## 2019-06-11 ASSESSMENT — EDINBURGH POSTNATAL DEPRESSION SCALE (EPDS)
I HAVE BLAMED MYSELF UNNECESSARILY WHEN THINGS WENT WRONG: NO, NEVER
THE THOUGHT OF HARMING MYSELF HAS OCCURRED TO ME: NEVER
I HAVE FELT SAD OR MISERABLE: NO, NOT AT ALL
I HAVE BEEN ABLE TO LAUGH AND SEE THE FUNNY SIDE OF THINGS: AS MUCH AS I ALWAYS COULD
THINGS HAVE BEEN GETTING ON TOP OF ME: NO, I HAVE BEEN COPING AS WELL AS EVER
I HAVE BEEN SO UNHAPPY THAT I HAVE BEEN CRYING: NO, NEVER
I HAVE FELT SCARED OR PANICKY FOR NO GOOD REASON: NO, NOT AT ALL
I HAVE BEEN ANXIOUS OR WORRIED FOR NO GOOD REASON: HARDLY EVER
I HAVE BEEN SO UNHAPPY THAT I HAVE HAD DIFFICULTY SLEEPING: NOT AT ALL
I HAVE LOOKED FORWARD WITH ENJOYMENT TO THINGS: AS MUCH AS I EVER DID

## 2019-06-11 NOTE — PROGRESS NOTES
Assisted to bathroom to void; able to void sufficient amount; bleeding light. Transferred to 350 via w/c

## 2019-06-11 NOTE — PROGRESS NOTES
Received patient to room S- 350 via w/c.  Report received from HARRISON Mosqueda and assumed care of patient.  Nursing assessment done.  She denies pain.  She was oriented to room, call light, infant security, emergency light, visiting hours, and unit routine.  Plan of care discussed.  Encouraged to call with any needs.

## 2019-06-11 NOTE — ANESTHESIA POSTPROCEDURE EVALUATION
Patient: Wade Bergeron    Procedure Summary     Date:  06/10/19 Room / Location:      Anesthesia Start:  1140 Anesthesia Stop:  1412    Procedure:  Labor Epidural Diagnosis:      Scheduled Providers:   Responsible Provider:  Tobey Gansert, M.D.    Anesthesia Type:  epidural ASA Status:  2          Final Anesthesia Type: epidural  Last vitals  BP   Blood Pressure: 109/60    Temp   36.7 °C (98.1 °F)    Pulse   Pulse: 78   Resp   17    SpO2   96 %      Anesthesia Post Evaluation    Patient location during evaluation: PACU  Patient participation: complete - patient participated  Level of consciousness: awake and alert  Pain score: 2    Airway patency: patent  Anesthetic complications: no  Cardiovascular status: hemodynamically stable  Respiratory status: acceptable  Hydration status: euvolemic    PONV: none           Nurse Pain Score: 2 (NPRS)

## 2019-06-11 NOTE — DISCHARGE PLANNING
Discharge Planning Assessment Post Partum     Reason for Referral: Flag by [facility] for concern of meth use during pregnancy.  Late/limited prenatal care.  Address[place] [address] [facility] [place] [address]  Phone: [phone]  Type of Living Situation: living with FOB and children  Mom Diagnosis: Pregnancy  Baby Diagnosis: [place]  Primary Language: English     Name of Baby: Tasha Berman (: 6/10/19)  Father of the Baby: Marquise Berman (: 93)  Involved in baby’s care? Yes  Contact Information: [phone]     Prenatal Care: Yes, starting at 22 weeks  Mom's PCP: None  PCP for new baby: Dr. Arteaga      Support System: FOB and MOB's family  Coping/Bonding between mother & baby: Yes  Source of Feeding: bottle feeding  Supplies for Infant: car seat, sister will be purchasing a bassinet, some clothes and blankets     Mom's Insurance: [place] Medicaid  Baby Covered on Insurance:Yes  Mother Employed/School: Not currently  Other children in the home/names & ages: 5 year old daughter-Wanda Berman (14) and 17 month old daughter-Agustina Berman (17)     Financial Hardship/Income: denies, states FOB is doing construction work  Mom's Mental status: alert and oriented  Services used prior to admit: Medicaid, food stamps, and WIC     CPS History: Yes, states it was past history.  Report called in today,  to [place].  Report is information only.  Psychiatric History: denies  Domestic Violence History: denies  Drug/ETOH History: denies use during pregnancy.  Infant's UDS is negative.     Resources Provided: pediatrician list, children and family resource list, counseling resource for post partum depression, baby bundle of supplies  Referrals Made: diaper bank referral provided      Clearance for Discharge: Infant is cleared to discharge home with MOB

## 2019-06-11 NOTE — ANESTHESIA TIME REPORT
Anesthesia Start and Stop Event Times     Date Time Event    6/10/2019 1140 Anesthesia Start     1412 Anesthesia Stop        Responsible Staff  06/10/19    Name Role Begin End    Tobey Gansert, M.D. Anesth 1140 1412        Preop Diagnosis (Free Text):  Pre-op Diagnosis             Preop Diagnosis (Codes):    Post op Diagnosis  Pain during labor      Premium Reason  Non-Premium    Comments:

## 2019-06-11 NOTE — PROGRESS NOTES
2014 Received awake on bed bonding with baby, Assessment done pt complained of moderate to severe abdominal pain medicated her as per doctor orders, Needs attended.

## 2019-06-12 VITALS
TEMPERATURE: 98.9 F | DIASTOLIC BLOOD PRESSURE: 75 MMHG | HEIGHT: 62 IN | HEART RATE: 61 BPM | BODY MASS INDEX: 23.92 KG/M2 | OXYGEN SATURATION: 98 % | WEIGHT: 130 LBS | SYSTOLIC BLOOD PRESSURE: 122 MMHG | RESPIRATION RATE: 15 BRPM

## 2019-06-12 PROCEDURE — A9270 NON-COVERED ITEM OR SERVICE: HCPCS | Performed by: SPECIALIST

## 2019-06-12 PROCEDURE — 700102 HCHG RX REV CODE 250 W/ 637 OVERRIDE(OP): Performed by: OBSTETRICS & GYNECOLOGY

## 2019-06-12 PROCEDURE — A9270 NON-COVERED ITEM OR SERVICE: HCPCS | Performed by: OBSTETRICS & GYNECOLOGY

## 2019-06-12 PROCEDURE — 700102 HCHG RX REV CODE 250 W/ 637 OVERRIDE(OP): Performed by: SPECIALIST

## 2019-06-12 RX ORDER — IBUPROFEN 600 MG/1
600 TABLET ORAL EVERY 6 HOURS PRN
Qty: 30 TAB | Refills: 0 | Status: ON HOLD | OUTPATIENT
Start: 2019-06-12 | End: 2020-06-09

## 2019-06-12 RX ORDER — OXYCODONE HYDROCHLORIDE AND ACETAMINOPHEN 5; 325 MG/1; MG/1
1 TABLET ORAL EVERY 6 HOURS PRN
Qty: 28 TAB | Refills: 0 | Status: SHIPPED | OUTPATIENT
Start: 2019-06-12 | End: 2019-06-19

## 2019-06-12 RX ADMIN — IBUPROFEN 600 MG: 600 TABLET ORAL at 00:57

## 2019-06-12 RX ADMIN — Medication 1 TABLET: at 05:53

## 2019-06-12 RX ADMIN — OXYCODONE HYDROCHLORIDE AND ACETAMINOPHEN 1 TABLET: 10; 325 TABLET ORAL at 00:57

## 2019-06-12 RX ADMIN — IBUPROFEN 600 MG: 600 TABLET ORAL at 08:50

## 2019-06-12 RX ADMIN — IBUPROFEN 600 MG: 600 TABLET ORAL at 17:19

## 2019-06-12 NOTE — CARE PLAN
Problem: Pain Management  Goal: Pain level will decrease to patient's comfort goal  Outcome: PROGRESSING AS EXPECTED  Patient reports mild to moderate pain, reports relief with PRN medications, will continue to monitor.     Problem: Altered physiologic condition related to immediate post-delivery state and potential for bleeding/hemorrhage  Goal: Patient physiologically stable as evidenced by normal lochia, palpable uterine involution and vital signs within normal limits  Outcome: PROGRESSING AS EXPECTED  Patient VSS, fundus firm, light lochia. Will continue to monitor.

## 2019-06-12 NOTE — PROGRESS NOTES
The patient is post partum day # 2 status post term normal spontaneous vaginal delivery.   She says that other than for some cramping pain she feels fine and has no other problems or complaints.   The patient's vital signs are stable and she is afebrile.   She appears well developed and well nourished and relaxed and alert and comfortable and in no apparent distress.   We will discharge the patient home today.   Rx's for percocet and ibuprofen.   Follow up in office in about 6 weeks for a post partum visit.   Jefferson Cook M.D.

## 2019-06-12 NOTE — PROGRESS NOTES
POST PARTUM DAY # 1 (Late entry)  The patient complains of cramping pain.   She says that she feels fine other wise and she says that she has no other problems or complaints.   She says that she would like to say until tomorrow.   The patient's vital signs are stable and she is afebrile.   She appears well developed and well nourished and relaxed and alert and comfortable and in no apparent distress.   Labs: the patient's hemoglobin went from 12.9 milligrams per deciliter antepartum to 10.8 grams per deciliter postpartum (this morning).  We will plan on discharge home tomorrow.   Jefferson Cook M.D.

## 2019-06-12 NOTE — DISCHARGE INSTRUCTIONS
POSTPARTUM DISCHARGE INSTRUCTIONS FOR MOM    YOB: 1995   Age: 24 y.o.               Admit Date: 6/10/2019     Discharge Date: 2019  Attending Doctor:  Jefferson Cook M.D.                  Allergies:  Patient has no known allergies.    Discharged to home by car. Discharged via wheelchair, hospital escort: Yes.  Special equipment needed: Not Applicable  Belongings with: Personal  Be sure to schedule a follow-up appointment with your primary care doctor or any specialists as instructed.     Discharge Plan:   Smoking Cessation Offered: Patient Counseled  Influenza Vaccine Indication: Indicated: Not available from distributor/    REASONS TO CALL YOUR OBSTETRICIAN:  1.   Persistent fever or shaking chills (Temperature higher than 100.4)  2.   Heavy bleeding (soaking more than 1 pad per hour); Passing clots  3.   Foul odor from vagina  4.   Mastitis (Breast infection; breast pain, chills, fever, redness)  5.   Urinary pain, burning or frequency  6.   Episiotomy infection  7.   Abdominal incision infection  8.   Severe depression longer than 24 hours    HAND WASHING  · Prior to handling the baby.  · Before breastfeeding or bottle feeding baby.  · After using the bathroom or changing the baby's diaper.    WOUND CARE  Ask your physician for additional care instructions.  In general:    ·  Incision:      · Keep clean and dry.    · Do NOT lift anything heavier than your baby for up to 6 weeks.    · There should not be any opening or pus.      VAGINAL CARE  · Nothing inside vagina for 6 weeks: no sexual intercourse, tampons or douching.  · Bleeding may continue for 2-4 weeks.  Amount may vary.    · Call your physician for heavy bleeding which means soaking more than 1 pad per hour    BIRTH CONTROL  · It is possible to become pregnant at any time after delivery and while breastfeeding.  · Plan to discuss a method of birth control with your physician at your follow up visit. visit.    DIET AND  "ELIMINATION  · Eating more fiber (bran cereal, fruits, and vegetables) and drinking plenty of fluids will help to avoid constipation.  · Urinary frequency after childbirth is normal.    POSTPARTUM BLUES  During the first few days after birth, you may experience a sense of the \"blues\" which may include impatience, irritability or even crying.  These feeling come and go quickly.  However, as many as 1 in 10 women experience emotional symptoms known as postpartum depression.    Postpartum depression:  May start as early as the second or third day after delivery or take several weeks or months to develop.  Symptoms of \"blues\" are present, but are more intense:  Crying spells; loss of appetite; feelings of hopelessness or loss of control; fear of touching the baby; over concern or no concern at all about the baby; little or no concern about your own appearance/caring for yourself; and/or inability to sleep or excessive sleeping.  Contact your physician if you are experiencing any of these symptoms.    Crisis Hotline:  · [place] Crisis Hotline:  9-371-GMJSKCT  Or 1-894.554.8113  · [place] Crisis Hotline:  1-480.360.5952  Or 137-656-3267    PREVENTING SHAKEN BABY:  If you are angry or stressed, PUT THE BABY IN THE CRIB, step away, take some deep breaths, and wait until you are calm to care for the baby.  DO NOT SHAKE THE BABY.  You are not alone, call a supporter for help.    · Crisis Call Center 24/7 crisis line 712-257-3148 or 1-902.298.5007  · You can also text them, text \"ANSWER\" to 672716    QUIT SMOKING/TOBACCO USE:  I understand the use of any tobacco products increases my chance of suffering from future heart disease and could cause other illnesses which may shorten my life. Quitting the use of tobacco products is the single most important thing I can do to improve my health. For further information on smoking / tobacco cessation call a Toll Free Quit Line at 1-104.933.2082 (*National Cancer [place]) or " 1-318.272.7846 (American Lung Association) or you can access the web based program at www.lungusa.org.    · [place] Tobacco Users Help Line:  (188) 324-2898       Toll Free: 1-160.404.2053  · Quit Tobacco Program [facility] (319)161-3776    DEPRESSION / SUICIDE RISK:  As you are discharged from this [facility], it is important to learn how to keep safe from harming yourself.    Recognize the warning signs:  · Abrupt changes in personality, positive or negative- including increase in energy   · Giving away possessions  · Change in eating patterns- significant weight changes-  positive or negative  · Change in sleeping patterns- unable to sleep or sleeping all the time   · Unwillingness or inability to communicate  · Depression  · Unusual sadness, discouragement and loneliness  · Talk of wanting to die  · Neglect of personal appearance   · Rebelliousness- reckless behavior  · Withdrawal from people/activities they love  · Confusion- inability to concentrate     If you or a loved one observes any of these behaviors or has concerns about self-harm, here's what you can do:  · Talk about it- your feelings and reasons for harming yourself  · Remove any means that you might use to hurt yourself (examples: pills, rope, extension cords, firearm)  · Get professional help from the community (Mental Health, Substance Abuse, psychological counseling)  · Do not be alone:Call your Safe Contact- someone whom you trust who will be there for you.  · Call your local CRISIS HOTLINE 118-0534 or 887-704-2259  · Call your local Children's Mobile Crisis Response Team [place] (392) 808-7930 or www.Daily Secret  · Call the toll free National Suicide Prevention Hotlines   · National Suicide Prevention Lifeline 673-684-TWBI (7213)  · National Hope Line Network 800-SUICIDE (564-4452)    DISCHARGE SURVEY:  Thank you for choosing [facility].  We hope we provided you with very good care.  You may be  receiving a survey in the mail.  Please fill it out.  Your opinion is valuable to us.    ADDITIONAL EDUCATIONAL MATERIALS GIVEN TO PATIENT:        My signature on this form indicates that:  1.  I have reviewed and understand the above information  2.  My questions regarding this information have been answered to my satisfaction.  3.  I have formulated a plan with my discharge nurse to obtain my prescribed medication for home.

## 2019-06-12 NOTE — PROGRESS NOTES
Assumed care of patient, report from HARRISON Grace.  Patient assessment complete.  Resting comfortably in bed holding infant.  Plan of care discussed, all questions answered at this time, will continue to monitor.

## 2019-06-12 NOTE — DISCHARGE SUMMARY
DATE OF ADMISSION:  Monday, 06/10/2019    DATE OF DISCHARGE:  2019    ADMISSION DIAGNOSES:  1.  Intrauterine pregnancy at 38 weeks and 1 day gestation.  2.  Spontaneous rupture of membranes.  3.  Labor.    DISCHARGE DIAGNOSES:  1.  Intrauterine pregnancy at 38 weeks and 1 day gestation.  2.  Spontaneous rupture of membranes.  3.  Labor.  4.  Live term female  with Apgar scores of 8 and 9 at 1 and 5 minutes   respectively and a  weight of 2700 grams.    PROCEDURES:  Normal spontaneous vaginal delivery.    COMPLICATIONS:  None.    HOSPITAL COURSE:  The patient has had her prenatal care with myself during the   course of this pregnancy.  She presented somewhat late for prenatal care.    She is a very pleasant 24-year-old (on admission  5, para 2, with 2   previous vaginal deliveries).  The patient presented to labor and delivery at   38 weeks and 1 day gestation, stating that she had experienced leakage of   fluid per vagina earlier in the morning and then she began having frequent and   painful uterine contractions.  She says that she experienced leakage of fluid   per vagina at about 5:20 in the morning on the day of admission, namely   Monday 06/10/2019.  She then began having uterine contractions.  Actually the   uterine contractions she said had started about 4:30 in the morning and then   she said at about 5:20 in the morning, she began having leakage of fluid per   vagina.  On presentation to labor and delivery, exam revealed a large amount   of clear amniotic fluid and her cervix on presentation to labor and delivery   was found to be about 2-3 cm dilated, 70% effaced and -2 station.  Since her   last delivery with me had been in April, a swab of the vaginal culture for   group B strep had not been performed.  She was admitted and her labor   progressed and she received a labor epidural, which functioned well.  Then in   the afternoon at about 2:00 in the afternoon or so,  Monday, 06/10/2019, she   went on to have a normal spontaneous vaginal delivery, at about 12 minutes   after 2:00 in the afternoon, at 38 weeks and 1 day gestation, and she   delivered a live term female  with Apgar scores of 8 and 9 at 1 and 5   minutes respectively and a  weight of 2700 grams.  Baby was delivered   over an intact perineum under continuous epidural anesthesia.  The placenta   was examined and found to be complete.  The estimated blood loss was   approximately 200 mL.  The patient's postpartum course was uncomplicated.  The   patient's antepartum hemoglobin was 12.9 g/dL and her postpartum hemoglobin   on postpartum day #1 was 10.8 g/dL.  Her vital signs have been stable and she   has been afebrile.  Today, Wednesday, 2019, postpartum day #2, the   patient says that she feels fine other than for pelvic cramping pain and she   says she has no other problems or complaints.  We are discharging the patient   home today with prescriptions for Percocet and ibuprofen, and I asked her to   follow up with me in the office in about 6 weeks for postpartum visit and to   call or contact me at any time should she ever have any problems or questions   or complaints and she said that she would do so.       ____________________________________     Jefferson Cook MD    MED / [facility]    DD:  2019 11:18:53  DT:  2019 15:02:46    D#:  [id number]  Job#:  [id number]

## 2019-12-22 ENCOUNTER — APPOINTMENT (OUTPATIENT)
Dept: RADIOLOGY | Facility: MEDICAL CENTER | Age: 24
End: 2019-12-22
Attending: EMERGENCY MEDICINE
Payer: MEDICAID

## 2019-12-22 ENCOUNTER — HOSPITAL ENCOUNTER (EMERGENCY)
Facility: MEDICAL CENTER | Age: 24
End: 2019-12-22
Attending: EMERGENCY MEDICINE
Payer: MEDICAID

## 2019-12-22 VITALS
HEIGHT: 62 IN | BODY MASS INDEX: 18.4 KG/M2 | DIASTOLIC BLOOD PRESSURE: 54 MMHG | OXYGEN SATURATION: 98 % | RESPIRATION RATE: 17 BRPM | WEIGHT: 100 LBS | SYSTOLIC BLOOD PRESSURE: 99 MMHG | TEMPERATURE: 98.7 F | HEART RATE: 61 BPM

## 2019-12-22 DIAGNOSIS — O03.9 MISCARRIAGE: ICD-10-CM

## 2019-12-22 LAB
B-HCG SERPL-ACNC: 6014.7 MIU/ML (ref 0–5)
BASOPHILS # BLD AUTO: 0.5 % (ref 0–1.8)
BASOPHILS # BLD: 0.02 K/UL (ref 0–0.12)
EOSINOPHIL # BLD AUTO: 0.07 K/UL (ref 0–0.51)
EOSINOPHIL NFR BLD: 1.9 % (ref 0–6.9)
ERYTHROCYTE [DISTWIDTH] IN BLOOD BY AUTOMATED COUNT: 43.5 FL (ref 35.9–50)
HCT VFR BLD AUTO: 41.8 % (ref 37–47)
HGB BLD-MCNC: 13.8 G/DL (ref 12–16)
IMM GRANULOCYTES # BLD AUTO: 0 K/UL (ref 0–0.11)
IMM GRANULOCYTES NFR BLD AUTO: 0 % (ref 0–0.9)
LYMPHOCYTES # BLD AUTO: 1.74 K/UL (ref 1–4.8)
LYMPHOCYTES NFR BLD: 46.8 % (ref 22–41)
MCH RBC QN AUTO: 30.1 PG (ref 27–33)
MCHC RBC AUTO-ENTMCNC: 33 G/DL (ref 33.6–35)
MCV RBC AUTO: 91.3 FL (ref 81.4–97.8)
MONOCYTES # BLD AUTO: 0.36 K/UL (ref 0–0.85)
MONOCYTES NFR BLD AUTO: 9.7 % (ref 0–13.4)
NEUTROPHILS # BLD AUTO: 1.53 K/UL (ref 2–7.15)
NEUTROPHILS NFR BLD: 41.1 % (ref 44–72)
NRBC # BLD AUTO: 0 K/UL
NRBC BLD-RTO: 0 /100 WBC
NUMBER OF RH DOSES IND 8505RD: NORMAL
PLATELET # BLD AUTO: 203 K/UL (ref 164–446)
PMV BLD AUTO: 9.9 FL (ref 9–12.9)
RBC # BLD AUTO: 4.58 M/UL (ref 4.2–5.4)
RH BLD: NORMAL
WBC # BLD AUTO: 3.7 K/UL (ref 4.8–10.8)

## 2019-12-22 PROCEDURE — 84702 CHORIONIC GONADOTROPIN TEST: CPT

## 2019-12-22 PROCEDURE — 86901 BLOOD TYPING SEROLOGIC RH(D): CPT

## 2019-12-22 PROCEDURE — 76815 OB US LIMITED FETUS(S): CPT

## 2019-12-22 PROCEDURE — 76801 OB US < 14 WKS SINGLE FETUS: CPT

## 2019-12-22 PROCEDURE — 99284 EMERGENCY DEPT VISIT MOD MDM: CPT

## 2019-12-22 PROCEDURE — 85025 COMPLETE CBC W/AUTO DIFF WBC: CPT

## 2019-12-22 NOTE — ED TRIAGE NOTES
"25 YO F arrives via EMS with complaints of possible miscarriage. Pt is Grava 6 Para 3. Last child in June of this year and last miscarriage was November of last year. Pt states she was cleaning and felt like she was going to pass a blood clot, when going to the bathroom pt stated \"something fell out of me.\" Pt brought tissue with her. Pt is alert pwd given 2 mg morphine en route for low back pain. No other complaints vss. No past medical history on file.    "

## 2019-12-22 NOTE — ED NOTES
Pt given discharge instructions, all questions answered, lines DC'd, pt alert,  steady gate, care transferred to patient and family at this time.

## 2019-12-22 NOTE — ED PROVIDER NOTES
ED Provider Note    CHIEF COMPLAINT  Chief Complaint   Patient presents with   • Other     possible miscarriage       HPI  Wade Bergeron is a 24 y.o. female who presents for crampy lower abdominal pain vaginal bleeding and passing of some potential fetal contents.  The patient has history of frequent miscarriage.  She had a miscarriage this last summer.  She did not take a pregnancy test but think she may have missed 1 or 2 menstrual cycles.  She was having a normal day and felt immediate pain in her lower abdomen in the past some blood and a clot with some tissue concerning for possible products of conception.  She denies lightheadedness or dizziness.  Bleeding has subsided.  She has no other complaints    REVIEW OF SYSTEMS  See HPI for further details.  No high fevers chills night sweats weight loss all other systems are negative.     PAST MEDICAL HISTORY  No past medical history on file.  History of frequent miscarriage  FAMILY HISTORY  Noncontributory    SOCIAL HISTORY  Social History     Socioeconomic History   • Marital status: Single     Spouse name: Not on file   • Number of children: Not on file   • Years of education: Not on file   • Highest education level: Not on file   Occupational History   • Not on file   Social Needs   • Financial resource strain: Not on file   • Food insecurity:     Worry: Not on file     Inability: Not on file   • Transportation needs:     Medical: Not on file     Non-medical: Not on file   Tobacco Use   • Smoking status: Never Smoker   • Smokeless tobacco: Never Used   • Tobacco comment: Quit 8/2013   Substance and Sexual Activity   • Alcohol use: No   • Drug use: Yes     Comment: stopped marajuana one year ago.    • Sexual activity: Not on file   Lifestyle   • Physical activity:     Days per week: Not on file     Minutes per session: Not on file   • Stress: Not on file   Relationships   • Social connections:     Talks on phone: Not on file     Gets together: Not on file      "Attends Anabaptism service: Not on file     Active member of club or organization: Not on file     Attends meetings of clubs or organizations: Not on file     Relationship status: Not on file   • Intimate partner violence:     Fear of current or ex partner: Not on file     Emotionally abused: Not on file     Physically abused: Not on file     Forced sexual activity: Not on file   Other Topics Concern   • Not on file   Social History Narrative   • Not on file   Denies IV drugs    SURGICAL HISTORY  No past surgical history on file.    CURRENT MEDICATIONS  Home Medications    **Home medications have not yet been reviewed for this encounter**     No regular meds    ALLERGIES  No Known Allergies    PHYSICAL EXAM  VITAL SIGNS: /59   Pulse 79   Temp 37.1 °C (98.7 °F) (Temporal)   Resp 15   Ht 1.575 m (5' 2\")   Wt 45.4 kg (100 lb)   SpO2 99%   Breastfeeding? Unknown   BMI 18.29 kg/m²       Constitutional: Well developed, Well nourished, No acute distress, Non-toxic appearance.   HENT: Normocephalic, Atraumatic, Bilateral external ears normal, Oropharynx moist, No oral exudates, Nose normal.   Eyes: PERRLA, EOMI, Conjunctiva normal, No discharge.   Neck: Normal range of motion, No tenderness, Supple, No stridor.   Cardiovascular: Normal heart rate, Normal rhythm, No murmurs, No rubs, No gallops.   Thorax & Lungs: Normal breath sounds, No respiratory distress, No wheezing, No chest tenderness.   Abdomen: Bowel sounds normal, Soft, No tenderness, No masses, No pulsatile masses.   Skin: Warm, Dry, No erythema, No rash.   Back: No tenderness, No CVA tenderness.   Genitalia: Patient declined pelvic exam   extremities: Intact distal pulses, No edema, No tenderness, No cyanosis, No clubbing.   Neurologic: Alert & oriented x 3, Normal motor function, Normal sensory function, No focal deficits noted.   Psychiatric: Anxious    US-OB LIMITED TRANSABDOMINAL   Final Result      No intrauterine pregnancy is identified. " Findings are nonspecific and may represent early intrauterine pregnancy or failed first trimester pregnancy. Ectopic pregnancy cannot be excluded.        Results for orders placed or performed during the hospital encounter of 12/22/19   CBC WITH DIFFERENTIAL   Result Value Ref Range    WBC 3.7 (L) 4.8 - 10.8 K/uL    RBC 4.58 4.20 - 5.40 M/uL    Hemoglobin 13.8 12.0 - 16.0 g/dL    Hematocrit 41.8 37.0 - 47.0 %    MCV 91.3 81.4 - 97.8 fL    MCH 30.1 27.0 - 33.0 pg    MCHC 33.0 (L) 33.6 - 35.0 g/dL    RDW 43.5 35.9 - 50.0 fL    Platelet Count 203 164 - 446 K/uL    MPV 9.9 9.0 - 12.9 fL    Neutrophils-Polys 41.10 (L) 44.00 - 72.00 %    Lymphocytes 46.80 (H) 22.00 - 41.00 %    Monocytes 9.70 0.00 - 13.40 %    Eosinophils 1.90 0.00 - 6.90 %    Basophils 0.50 0.00 - 1.80 %    Immature Granulocytes 0.00 0.00 - 0.90 %    Nucleated RBC 0.00 /100 WBC    Neutrophils (Absolute) 1.53 (L) 2.00 - 7.15 K/uL    Lymphs (Absolute) 1.74 1.00 - 4.80 K/uL    Monos (Absolute) 0.36 0.00 - 0.85 K/uL    Eos (Absolute) 0.07 0.00 - 0.51 K/uL    Baso (Absolute) 0.02 0.00 - 0.12 K/uL    Immature Granulocytes (abs) 0.00 0.00 - 0.11 K/uL    NRBC (Absolute) 0.00 K/uL   RH TYPE FOR RHOGAM FROM E.D.   Result Value Ref Range    Emergency Department Rh Typing POS     Number Of Rh Doses Indicated ZERO    HCG QUANTITATIVE   Result Value Ref Range    Bhcg 6014.7 (H) 0.0 - 5.0 mIU/mL        COURSE & MEDICAL DECISION MAKING  Pertinent Labs & Imaging studies reviewed. (See chart for details)  I reviewed the products of conception none at appears most consistent with likely completed miscarriage.  Quantitative hCG is positive at 6000 and there is nothing appreciable in the uterus.  Rh is positive.  I will refer her to the pregnancy center for follow-up in 48-hour    FINAL IMPRESSION  1.  Likely completed miscarriage         Electronically signed by: Luca Curtis, 12/22/2019 11:40 AM

## 2020-06-09 ENCOUNTER — APPOINTMENT (OUTPATIENT)
Dept: RADIOLOGY | Facility: MEDICAL CENTER | Age: 25
End: 2020-06-09
Attending: NURSE PRACTITIONER
Payer: MEDICAID

## 2020-06-09 ENCOUNTER — HOSPITAL ENCOUNTER (OUTPATIENT)
Facility: MEDICAL CENTER | Age: 25
End: 2020-06-09
Attending: OBSTETRICS & GYNECOLOGY | Admitting: OBSTETRICS & GYNECOLOGY
Payer: MEDICAID

## 2020-06-09 VITALS
BODY MASS INDEX: 18.29 KG/M2 | SYSTOLIC BLOOD PRESSURE: 107 MMHG | TEMPERATURE: 99.1 F | RESPIRATION RATE: 16 BRPM | HEIGHT: 62 IN | OXYGEN SATURATION: 98 % | DIASTOLIC BLOOD PRESSURE: 56 MMHG | HEART RATE: 77 BPM

## 2020-06-09 LAB
ABO GROUP BLD: NORMAL
APPEARANCE UR: CLEAR
BASOPHILS # BLD AUTO: 0.4 % (ref 0–1.8)
BASOPHILS # BLD: 0.03 K/UL (ref 0–0.12)
BLD GP AB SCN SERPL QL: NORMAL
COLOR UR AUTO: YELLOW
EOSINOPHIL # BLD AUTO: 0.11 K/UL (ref 0–0.51)
EOSINOPHIL NFR BLD: 1.4 % (ref 0–6.9)
ERYTHROCYTE [DISTWIDTH] IN BLOOD BY AUTOMATED COUNT: 45.1 FL (ref 35.9–50)
GLUCOSE UR QL STRIP.AUTO: NEGATIVE MG/DL
HBV SURFACE AG SER QL: ABNORMAL
HCT VFR BLD AUTO: 34.7 % (ref 37–47)
HCV AB SER QL: ABNORMAL
HGB BLD-MCNC: 11.7 G/DL (ref 12–16)
HIV 1+2 AB+HIV1 P24 AG SERPL QL IA: NORMAL
IMM GRANULOCYTES # BLD AUTO: 0.06 K/UL (ref 0–0.11)
IMM GRANULOCYTES NFR BLD AUTO: 0.8 % (ref 0–0.9)
KETONES UR QL STRIP.AUTO: NEGATIVE MG/DL
LEUKOCYTE ESTERASE UR QL STRIP.AUTO: ABNORMAL
LYMPHOCYTES # BLD AUTO: 2.09 K/UL (ref 1–4.8)
LYMPHOCYTES NFR BLD: 26.4 % (ref 22–41)
MCH RBC QN AUTO: 31.1 PG (ref 27–33)
MCHC RBC AUTO-ENTMCNC: 33.7 G/DL (ref 33.6–35)
MCV RBC AUTO: 92.3 FL (ref 81.4–97.8)
MONOCYTES # BLD AUTO: 0.67 K/UL (ref 0–0.85)
MONOCYTES NFR BLD AUTO: 8.4 % (ref 0–13.4)
NEUTROPHILS # BLD AUTO: 4.97 K/UL (ref 2–7.15)
NEUTROPHILS NFR BLD: 62.6 % (ref 44–72)
NITRITE UR QL STRIP.AUTO: NEGATIVE
NRBC # BLD AUTO: 0 K/UL
NRBC BLD-RTO: 0 /100 WBC
PH UR STRIP.AUTO: 6.5 [PH] (ref 5–8)
PLATELET # BLD AUTO: 184 K/UL (ref 164–446)
PMV BLD AUTO: 10.9 FL (ref 9–12.9)
PROT UR QL STRIP: 30 MG/DL
RBC # BLD AUTO: 3.76 M/UL (ref 4.2–5.4)
RBC UR QL AUTO: ABNORMAL
RH BLD: NORMAL
RUBV AB SER QL: 273 IU/ML
SP GR UR: >=1.03 (ref 1–1.03)
TREPONEMA PALLIDUM IGG+IGM AB [PRESENCE] IN SERUM OR PLASMA BY IMMUNOASSAY: ABNORMAL
WBC # BLD AUTO: 7.9 K/UL (ref 4.8–10.8)

## 2020-06-09 PROCEDURE — 86900 BLOOD TYPING SEROLOGIC ABO: CPT

## 2020-06-09 PROCEDURE — 36415 COLL VENOUS BLD VENIPUNCTURE: CPT

## 2020-06-09 PROCEDURE — 86762 RUBELLA ANTIBODY: CPT

## 2020-06-09 PROCEDURE — 81002 URINALYSIS NONAUTO W/O SCOPE: CPT

## 2020-06-09 PROCEDURE — 87340 HEPATITIS B SURFACE AG IA: CPT

## 2020-06-09 PROCEDURE — 85025 COMPLETE CBC W/AUTO DIFF WBC: CPT

## 2020-06-09 PROCEDURE — 86780 TREPONEMA PALLIDUM: CPT

## 2020-06-09 PROCEDURE — 76805 OB US >/= 14 WKS SNGL FETUS: CPT

## 2020-06-09 PROCEDURE — 87389 HIV-1 AG W/HIV-1&-2 AB AG IA: CPT

## 2020-06-09 PROCEDURE — 86901 BLOOD TYPING SEROLOGIC RH(D): CPT

## 2020-06-09 PROCEDURE — 86850 RBC ANTIBODY SCREEN: CPT

## 2020-06-09 PROCEDURE — 86803 HEPATITIS C AB TEST: CPT

## 2020-06-10 NOTE — PROGRESS NOTES
"1905- Pt to LnD for some \"[pulsating pain from her vagina to her butt\" as well as abdominal cramping as stated by the pt. She is unsure of her LMP or her due date. She has not received prenatal care. US and TOCO applied. VSS.   Orders from [facility] for an US and prenatal panel.   2225- RN called [facility] with US and lab results. Order to discharge pt. Pt is calling sister now for ride home.   2244- Pt given discharge instructions. Pt verbalizes understanding. No questions for RN. Pt ambulated out.   "

## 2020-09-15 LAB — STREP GP B DNA PCR: NEGATIVE

## 2020-09-24 ENCOUNTER — HOSPITAL ENCOUNTER (INPATIENT)
Facility: MEDICAL CENTER | Age: 25
LOS: 3 days | End: 2020-09-27
Attending: SPECIALIST | Admitting: SPECIALIST
Payer: MEDICAID

## 2020-09-24 ENCOUNTER — ANESTHESIA EVENT (OUTPATIENT)
Dept: ANESTHESIOLOGY | Facility: MEDICAL CENTER | Age: 25
End: 2020-09-24
Payer: MEDICAID

## 2020-09-24 ENCOUNTER — ANESTHESIA (OUTPATIENT)
Dept: ANESTHESIOLOGY | Facility: MEDICAL CENTER | Age: 25
End: 2020-09-24
Payer: MEDICAID

## 2020-09-24 LAB
BASOPHILS # BLD AUTO: 0.2 % (ref 0–1.8)
BASOPHILS # BLD: 0.02 K/UL (ref 0–0.12)
COVID ORDER STATUS COVID19: NORMAL
EOSINOPHIL # BLD AUTO: 0.07 K/UL (ref 0–0.51)
EOSINOPHIL NFR BLD: 0.8 % (ref 0–6.9)
ERYTHROCYTE [DISTWIDTH] IN BLOOD BY AUTOMATED COUNT: 46.3 FL (ref 35.9–50)
HCT VFR BLD AUTO: 38.8 % (ref 37–47)
HGB BLD-MCNC: 12.8 G/DL (ref 12–16)
HOLDING TUBE BB 8507: NORMAL
IMM GRANULOCYTES # BLD AUTO: 0.05 K/UL (ref 0–0.11)
IMM GRANULOCYTES NFR BLD AUTO: 0.6 % (ref 0–0.9)
LYMPHOCYTES # BLD AUTO: 1.93 K/UL (ref 1–4.8)
LYMPHOCYTES NFR BLD: 22.9 % (ref 22–41)
MCH RBC QN AUTO: 30.3 PG (ref 27–33)
MCHC RBC AUTO-ENTMCNC: 33 G/DL (ref 33.6–35)
MCV RBC AUTO: 91.7 FL (ref 81.4–97.8)
MONOCYTES # BLD AUTO: 0.83 K/UL (ref 0–0.85)
MONOCYTES NFR BLD AUTO: 9.9 % (ref 0–13.4)
NEUTROPHILS # BLD AUTO: 5.52 K/UL (ref 2–7.15)
NEUTROPHILS NFR BLD: 65.6 % (ref 44–72)
NRBC # BLD AUTO: 0 K/UL
NRBC BLD-RTO: 0 /100 WBC
PLATELET # BLD AUTO: 165 K/UL (ref 164–446)
PMV BLD AUTO: 11.9 FL (ref 9–12.9)
RBC # BLD AUTO: 4.23 M/UL (ref 4.2–5.4)
SARS-COV-2 RDRP RESP QL NAA+PROBE: NOTDETECTED
SPECIMEN SOURCE: NORMAL
WBC # BLD AUTO: 8.4 K/UL (ref 4.8–10.8)

## 2020-09-24 PROCEDURE — 10907ZC DRAINAGE OF AMNIOTIC FLUID, THERAPEUTIC FROM PRODUCTS OF CONCEPTION, VIA NATURAL OR ARTIFICIAL OPENING: ICD-10-PCS | Performed by: OBSTETRICS & GYNECOLOGY

## 2020-09-24 PROCEDURE — 700111 HCHG RX REV CODE 636 W/ 250 OVERRIDE (IP): Performed by: SPECIALIST

## 2020-09-24 PROCEDURE — 770002 HCHG ROOM/CARE - OB PRIVATE (112)

## 2020-09-24 PROCEDURE — 36415 COLL VENOUS BLD VENIPUNCTURE: CPT

## 2020-09-24 PROCEDURE — 700105 HCHG RX REV CODE 258: Performed by: SPECIALIST

## 2020-09-24 PROCEDURE — 700111 HCHG RX REV CODE 636 W/ 250 OVERRIDE (IP): Performed by: ANESTHESIOLOGY

## 2020-09-24 PROCEDURE — 700111 HCHG RX REV CODE 636 W/ 250 OVERRIDE (IP)

## 2020-09-24 PROCEDURE — U0004 COV-19 TEST NON-CDC HGH THRU: HCPCS

## 2020-09-24 PROCEDURE — C9803 HOPD COVID-19 SPEC COLLECT: HCPCS | Performed by: SPECIALIST

## 2020-09-24 PROCEDURE — 85025 COMPLETE CBC W/AUTO DIFF WBC: CPT

## 2020-09-24 RX ORDER — ROPIVACAINE HYDROCHLORIDE 2 MG/ML
INJECTION, SOLUTION EPIDURAL; INFILTRATION; PERINEURAL CONTINUOUS
Status: DISCONTINUED | OUTPATIENT
Start: 2020-09-24 | End: 2020-09-25

## 2020-09-24 RX ORDER — SODIUM CHLORIDE, SODIUM LACTATE, POTASSIUM CHLORIDE, CALCIUM CHLORIDE 600; 310; 30; 20 MG/100ML; MG/100ML; MG/100ML; MG/100ML
INJECTION, SOLUTION INTRAVENOUS CONTINUOUS
Status: DISPENSED | OUTPATIENT
Start: 2020-09-24 | End: 2020-09-24

## 2020-09-24 RX ORDER — MISOPROSTOL 200 UG/1
800 TABLET ORAL
Status: DISCONTINUED | OUTPATIENT
Start: 2020-09-24 | End: 2020-09-25 | Stop reason: HOSPADM

## 2020-09-24 RX ORDER — SODIUM CHLORIDE, SODIUM LACTATE, POTASSIUM CHLORIDE, AND CALCIUM CHLORIDE .6; .31; .03; .02 G/100ML; G/100ML; G/100ML; G/100ML
250 INJECTION, SOLUTION INTRAVENOUS PRN
Status: DISCONTINUED | OUTPATIENT
Start: 2020-09-24 | End: 2020-09-25 | Stop reason: HOSPADM

## 2020-09-24 RX ORDER — BUPIVACAINE HYDROCHLORIDE 2.5 MG/ML
INJECTION, SOLUTION EPIDURAL; INFILTRATION; INTRACAUDAL PRN
Status: DISCONTINUED | OUTPATIENT
Start: 2020-09-24 | End: 2020-09-25 | Stop reason: SURG

## 2020-09-24 RX ORDER — SODIUM CHLORIDE, SODIUM LACTATE, POTASSIUM CHLORIDE, AND CALCIUM CHLORIDE .6; .31; .03; .02 G/100ML; G/100ML; G/100ML; G/100ML
1000 INJECTION, SOLUTION INTRAVENOUS
Status: DISCONTINUED | OUTPATIENT
Start: 2020-09-24 | End: 2020-09-25 | Stop reason: HOSPADM

## 2020-09-24 RX ORDER — DEXTROSE, SODIUM CHLORIDE, SODIUM LACTATE, POTASSIUM CHLORIDE, AND CALCIUM CHLORIDE 5; .6; .31; .03; .02 G/100ML; G/100ML; G/100ML; G/100ML; G/100ML
INJECTION, SOLUTION INTRAVENOUS CONTINUOUS
Status: DISCONTINUED | OUTPATIENT
Start: 2020-09-24 | End: 2020-09-25

## 2020-09-24 RX ORDER — ROPIVACAINE HYDROCHLORIDE 2 MG/ML
INJECTION, SOLUTION EPIDURAL; INFILTRATION; PERINEURAL
Status: COMPLETED
Start: 2020-09-24 | End: 2020-09-24

## 2020-09-24 RX ORDER — ALUMINA, MAGNESIA, AND SIMETHICONE 2400; 2400; 240 MG/30ML; MG/30ML; MG/30ML
30 SUSPENSION ORAL EVERY 6 HOURS PRN
Status: DISCONTINUED | OUTPATIENT
Start: 2020-09-24 | End: 2020-09-25 | Stop reason: HOSPADM

## 2020-09-24 RX ORDER — BUPIVACAINE HYDROCHLORIDE 2.5 MG/ML
INJECTION, SOLUTION EPIDURAL; INFILTRATION; INTRACAUDAL
Status: COMPLETED
Start: 2020-09-24 | End: 2020-09-24

## 2020-09-24 RX ADMIN — ROPIVACAINE HYDROCHLORIDE 200 MG: 2 INJECTION, SOLUTION EPIDURAL; INFILTRATION at 21:26

## 2020-09-24 RX ADMIN — FENTANYL CITRATE 100 MCG: 50 INJECTION INTRAMUSCULAR; INTRAVENOUS at 19:02

## 2020-09-24 RX ADMIN — FENTANYL CITRATE 100 MCG: 50 INJECTION, SOLUTION INTRAMUSCULAR; INTRAVENOUS at 21:23

## 2020-09-24 RX ADMIN — BUPIVACAINE HYDROCHLORIDE 5 ML: 2.5 INJECTION, SOLUTION EPIDURAL; INFILTRATION; INTRACAUDAL; PERINEURAL at 21:23

## 2020-09-24 RX ADMIN — SODIUM CHLORIDE, POTASSIUM CHLORIDE, SODIUM LACTATE AND CALCIUM CHLORIDE: 600; 310; 30; 20 INJECTION, SOLUTION INTRAVENOUS at 21:31

## 2020-09-24 RX ADMIN — SODIUM CHLORIDE, POTASSIUM CHLORIDE, SODIUM LACTATE AND CALCIUM CHLORIDE: 600; 310; 30; 20 INJECTION, SOLUTION INTRAVENOUS at 21:00

## 2020-09-24 SDOH — ECONOMIC STABILITY: FOOD INSECURITY: WITHIN THE PAST 12 MONTHS, THE FOOD YOU BOUGHT JUST DIDN'T LAST AND YOU DIDN'T HAVE MONEY TO GET MORE.: NEVER TRUE

## 2020-09-24 SDOH — ECONOMIC STABILITY: TRANSPORTATION INSECURITY
IN THE PAST 12 MONTHS, HAS LACK OF TRANSPORTATION KEPT YOU FROM MEETINGS, WORK, OR FROM GETTING THINGS NEEDED FOR DAILY LIVING?: NO

## 2020-09-24 SDOH — ECONOMIC STABILITY: FOOD INSECURITY: WITHIN THE PAST 12 MONTHS, YOU WORRIED THAT YOUR FOOD WOULD RUN OUT BEFORE YOU GOT MONEY TO BUY MORE.: NEVER TRUE

## 2020-09-24 SDOH — ECONOMIC STABILITY: TRANSPORTATION INSECURITY
IN THE PAST 12 MONTHS, HAS THE LACK OF TRANSPORTATION KEPT YOU FROM MEDICAL APPOINTMENTS OR FROM GETTING MEDICATIONS?: NO

## 2020-09-24 ASSESSMENT — LIFESTYLE VARIABLES
EVER_SMOKED: YES
ALCOHOL_USE: NO

## 2020-09-24 ASSESSMENT — PATIENT HEALTH QUESTIONNAIRE - PHQ9
SUM OF ALL RESPONSES TO PHQ9 QUESTIONS 1 AND 2: 0
1. LITTLE INTEREST OR PLEASURE IN DOING THINGS: NOT AT ALL
2. FEELING DOWN, DEPRESSED, IRRITABLE, OR HOPELESS: NOT AT ALL

## 2020-09-24 NOTE — PROGRESS NOTES
EDC - 10/8/20 EGA - 38.0    1408 - Pt arrived to labor and delivery for UCs that have become more intense. Pt placed in room 216. External monitors in place X2. Category I FHT at this time. VSS. Pt reports good FM. No complaints of ROM or vaginal bleeding.   1419 SVE /-3. FOB at bedside. POC discussed with pt and family members, all questions answered.   1423 Call to Dr. Cook, left message.  1442 Dr. Cook called, left message  1444 Dr. Cook called back, notified of pt status. Orders received for labor.  1510 IV started, labs drawn. Covid swab obtained   Dr. Cook at bedside SVE-unable to reach cervix   SVE -unchanged 60/-3 POC discussed with Dr. Cook to give fentanyl then recheck in 2 hours. Pt agrees to POC   Fentanyl given see MAR  2042 Dr. Tolliver at bedside SVE /-2, pt requesting an epidural.   - Dr. Burroughs at bedside. Time out called at . Epidural placed at this time by Dr Burroughs. Test dose at  - No reaction detected - VSS. Dermatome level of T8. Epidural infusion settings are : 10mL/hr continuous infusion, 5mL bolus every 15 minutes with a 25mL/hr dose limit.    Report given to BRIANA Allen RN   - Marquis placed and draining to gravity.  Category 1 FHT tracing at this time. No complaints at this time.

## 2020-09-25 LAB
ERYTHROCYTE [DISTWIDTH] IN BLOOD BY AUTOMATED COUNT: 45.7 FL (ref 35.9–50)
HCT VFR BLD AUTO: 34.7 % (ref 37–47)
HGB BLD-MCNC: 11.6 G/DL (ref 12–16)
MCH RBC QN AUTO: 30.4 PG (ref 27–33)
MCHC RBC AUTO-ENTMCNC: 33.4 G/DL (ref 33.6–35)
MCV RBC AUTO: 91.1 FL (ref 81.4–97.8)
PLATELET # BLD AUTO: 144 K/UL (ref 164–446)
PMV BLD AUTO: 11.7 FL (ref 9–12.9)
RBC # BLD AUTO: 3.81 M/UL (ref 4.2–5.4)
WBC # BLD AUTO: 10.5 K/UL (ref 4.8–10.8)

## 2020-09-25 PROCEDURE — 700102 HCHG RX REV CODE 250 W/ 637 OVERRIDE(OP): Performed by: OBSTETRICS & GYNECOLOGY

## 2020-09-25 PROCEDURE — 59409 OBSTETRICAL CARE: CPT

## 2020-09-25 PROCEDURE — 36415 COLL VENOUS BLD VENIPUNCTURE: CPT

## 2020-09-25 PROCEDURE — A9270 NON-COVERED ITEM OR SERVICE: HCPCS | Performed by: OBSTETRICS & GYNECOLOGY

## 2020-09-25 PROCEDURE — 303615 HCHG EPIDURAL/SPINAL ANESTHESIA FOR LABOR

## 2020-09-25 PROCEDURE — 700102 HCHG RX REV CODE 250 W/ 637 OVERRIDE(OP): Performed by: SPECIALIST

## 2020-09-25 PROCEDURE — 770002 HCHG ROOM/CARE - OB PRIVATE (112)

## 2020-09-25 PROCEDURE — 700111 HCHG RX REV CODE 636 W/ 250 OVERRIDE (IP): Performed by: SPECIALIST

## 2020-09-25 PROCEDURE — 85027 COMPLETE CBC AUTOMATED: CPT

## 2020-09-25 PROCEDURE — A9270 NON-COVERED ITEM OR SERVICE: HCPCS | Performed by: SPECIALIST

## 2020-09-25 PROCEDURE — 304965 HCHG RECOVERY SERVICES

## 2020-09-25 RX ORDER — MISOPROSTOL 200 UG/1
800 TABLET ORAL
Status: DISCONTINUED | OUTPATIENT
Start: 2020-09-25 | End: 2020-09-27 | Stop reason: HOSPADM

## 2020-09-25 RX ORDER — ONDANSETRON 2 MG/ML
4 INJECTION INTRAMUSCULAR; INTRAVENOUS EVERY 6 HOURS PRN
Status: DISCONTINUED | OUTPATIENT
Start: 2020-09-25 | End: 2020-09-27 | Stop reason: HOSPADM

## 2020-09-25 RX ORDER — IBUPROFEN 600 MG/1
600 TABLET ORAL EVERY 6 HOURS PRN
Status: DISCONTINUED | OUTPATIENT
Start: 2020-09-25 | End: 2020-09-27 | Stop reason: HOSPADM

## 2020-09-25 RX ORDER — DOCUSATE SODIUM 100 MG/1
100 CAPSULE, LIQUID FILLED ORAL 2 TIMES DAILY PRN
Status: DISCONTINUED | OUTPATIENT
Start: 2020-09-25 | End: 2020-09-27 | Stop reason: HOSPADM

## 2020-09-25 RX ORDER — SODIUM CHLORIDE, SODIUM LACTATE, POTASSIUM CHLORIDE, CALCIUM CHLORIDE 600; 310; 30; 20 MG/100ML; MG/100ML; MG/100ML; MG/100ML
INJECTION, SOLUTION INTRAVENOUS PRN
Status: DISCONTINUED | OUTPATIENT
Start: 2020-09-25 | End: 2020-09-27 | Stop reason: HOSPADM

## 2020-09-25 RX ORDER — OXYCODONE HYDROCHLORIDE AND ACETAMINOPHEN 5; 325 MG/1; MG/1
1 TABLET ORAL EVERY 4 HOURS PRN
Status: DISCONTINUED | OUTPATIENT
Start: 2020-09-25 | End: 2020-09-27 | Stop reason: HOSPADM

## 2020-09-25 RX ORDER — OXYCODONE AND ACETAMINOPHEN 10; 325 MG/1; MG/1
1 TABLET ORAL EVERY 4 HOURS PRN
Status: DISCONTINUED | OUTPATIENT
Start: 2020-09-25 | End: 2020-09-27 | Stop reason: HOSPADM

## 2020-09-25 RX ORDER — ONDANSETRON 4 MG/1
4 TABLET, ORALLY DISINTEGRATING ORAL EVERY 6 HOURS PRN
Status: DISCONTINUED | OUTPATIENT
Start: 2020-09-25 | End: 2020-09-27 | Stop reason: HOSPADM

## 2020-09-25 RX ADMIN — IBUPROFEN 600 MG: 600 TABLET ORAL at 20:39

## 2020-09-25 RX ADMIN — OXYTOCIN 125 ML/HR: 10 INJECTION, SOLUTION INTRAMUSCULAR; INTRAVENOUS at 03:21

## 2020-09-25 RX ADMIN — IBUPROFEN 600 MG: 600 TABLET ORAL at 11:20

## 2020-09-25 RX ADMIN — OXYCODONE HYDROCHLORIDE AND ACETAMINOPHEN 1 TABLET: 5; 325 TABLET ORAL at 11:20

## 2020-09-25 RX ADMIN — OXYCODONE HYDROCHLORIDE AND ACETAMINOPHEN 1 TABLET: 10; 325 TABLET ORAL at 04:27

## 2020-09-25 RX ADMIN — IBUPROFEN 600 MG: 600 TABLET ORAL at 04:27

## 2020-09-25 RX ADMIN — OXYTOCIN 2000 ML/HR: 10 INJECTION, SOLUTION INTRAMUSCULAR; INTRAVENOUS at 02:20

## 2020-09-25 RX ADMIN — DOCUSATE SODIUM 100 MG: 100 CAPSULE ORAL at 04:27

## 2020-09-25 RX ADMIN — OXYCODONE HYDROCHLORIDE AND ACETAMINOPHEN 1 TABLET: 5; 325 TABLET ORAL at 20:39

## 2020-09-25 ASSESSMENT — PAIN SCALES - GENERAL: PAIN_LEVEL: 0

## 2020-09-25 ASSESSMENT — PAIN DESCRIPTION - PAIN TYPE
TYPE: ACUTE PAIN

## 2020-09-25 NOTE — ANESTHESIA POSTPROCEDURE EVALUATION
Patient: Wade Bergeron    Procedure Summary     Date: 09/24/20 Room / Location:     Anesthesia Start: 2113 Anesthesia Stop: 09/25/20 0215    Procedure: Labor Epidural Diagnosis:     Scheduled Providers:  Responsible Provider: Cleveland Burroughs M.D.    Anesthesia Type: epidural ASA Status: 2          Final Anesthesia Type: epidural  Last vitals  BP   Blood Pressure: 120/60    Temp   36.7 °C (98 °F)    Pulse   Pulse: 61   Resp   18    SpO2   100 %      Anesthesia Post Evaluation    Patient location during evaluation: bedside  Patient participation: complete - patient participated  Level of consciousness: awake and alert  Pain score: 0    Airway patency: patent  Anesthetic complications: no  Cardiovascular status: hemodynamically stable  Respiratory status: acceptable  Hydration status: euvolemic    PONV: none           Nurse Pain Score: 0 (NPRS)

## 2020-09-25 NOTE — L&D DELIVERY NOTE
DATE OF SERVICE:  2020    DATE OF DELIVERY:  2020    DELIVERING PHYSICIAN:  Gloria Tolliver MD    PRIMARY OBSTETRICIAN:  Jefferson Cook MD    BRIEF HISTORY:  This is a 25-year-old  4, para 3 at 38-3/7 who was   admitted by Dr. Cook with labor and cervical change.  I assumed her care on   the evening of , at which time she was 5 cm dilated.  She progressed to   6-7 cm dilated, had an epidural placed.  Amniotomy performed, moderate   meconium fluid noted.  She progressed well to completely dilated, pushed with   several contractions and proceeded with delivery.  Fetal heart tracing is   reactive, reassuring, category 1 throughout the entire labor and delivery   process.  Group B strep culture is negative.    DELIVERY NOTE:  Delivery is normal spontaneous vaginal delivery.  Fetal   position, left occiput anterior.    COMPLICATIONS:  None.    ESTIMATED BLOOD LOSS:  300 mL.    PLACENTA DELIVERY:  Spontaneous.    CONDITION:  Intact 3-vessel cord.    LACERATIONS:  None.    INFANT:  Male, Apgars 8 and 9 at 1 and 5 minutes respectively.  Weight is   pending at this time.  His name is Edu.    NARRATIVE:  I was called.  The patient was complete, placed in dorsal   lithotomy position, prepped appropriately.  With the ensuing several   contractions, she delivered the fetal vertex, left occiput anterior   presentation over intact perineum.  Nose and mouth suctioned with bulb suction   of clear fluid appearing at this time, although definitely see moderate   meconium prior and maternal expulsive efforts easily delivered the anterior   shoulder followed by the posterior shoulder and the remainder of the infant   and a pink, vigorous, crying infant handed directly to mother's chest.    Approximately 2 minutes after delivery, the cord was doubly clamped and cut by   the father of the baby.  The placenta then delivered spontaneously, was   carefully inspected and found to be intact with a 3-vessel  cord.  IV Pitocin,   bimanual massage, fundus firms up well with minimal postpartum bleeding.  The   vagina and perineum were carefully inspected and found to be hemostatic,   intact, and without laceration.  At this time, all instruments and sponges   were removed.  Sponge counts correct and the procedure was ended.  Both mother   and baby tolerated delivery well and are stable and bonding postpartum.       ____________________________________     MD CAROL Cat / LANG    DD:  09/25/2020 02:27:35  DT:  09/25/2020 02:46:54    D#:  [id number]  Job#:  [id number]    cc: Jefferson Cook MD

## 2020-09-25 NOTE — LACTATION NOTE
MOB states she is both breast and formula feeding baby, she states she did the same with her other children as well, she states baby breastfeeds well and declines offer for assistance at this time, she denies having any unaddressed questions or concerns for lactation at this time, encouraged to call for assistance if desired/needed.

## 2020-09-25 NOTE — CARE PLAN
Problem: Communication  Goal: The ability to communicate needs accurately and effectively will improve  Outcome: PROGRESSING AS EXPECTEDPatient's POC discussed and questions answered. Patient asking and answering questions appropriately.       Problem: Safety  Goal: Will remain free from injury  Outcome: PROGRESSING AS EXPECTED  Right medication and patient armband scanned prior to administration. Patient's family instructed to notify RN of any spills, wires or anything unsafe in room.

## 2020-09-25 NOTE — CARE PLAN
Problem: Pain  Goal: Alleviation of Pain or a reduction in pain to the patient's comfort goal  Outcome: PROGRESSING AS EXPECTED  Note: Pt resting comfortably with epidural     Problem: Risk for Infection, Impaired Wound Healing  Goal: Remain free from signs and symptoms of infection  Outcome: PROGRESSING AS EXPECTED  Note: No s/s of infection noted, pt remains afebrile

## 2020-09-25 NOTE — H&P
Elyssa Olvera          YOB: 1995  Date of today's admission:   Facility: [facility]    ID: The patient is a very pleasant 25-year-old multipara ( 4, para-3, and she has had 3 previous vaginal deliveries) who is today 38 weeks and 2 days gestation.    Chief Complaint: The patient presents today with points of frequent and painful uterine contractions.    History of Present Illness: The patient has had her prenatal care during the course of this pregnancy. Her due date was established as 2020. She was also seen in consultation during the course of this pregnancy by perinatology ([facility] [place]). She presented to [facility] several minutes after 2 o’clock this afternoon and she presented with complaints of frequent and painful uterine contractions. Of note, I did see her in the office yesterday for a return OB visit and when I saw her in the office yesterday I checked her cervix and at the time found her cervix you are 3 cm dilated and 70% effaced and -2 station. On presentation to labor and delivery this afternoon the nurse checked her cervix and by the nurses exam her cervix was 5 cm dilated, 60% effaced, and -3 station. She was admitted. Of note, her recent vaginal culture for group B strep came back negative for group B strep. The fetal heart racing has been category one. Does appear on the monitor that she is having contractions with a frequency of about every five minutes.    Past Medical History: The patient says that she has no medical illnesses.    Past Surgical History: The patient says that she has had no previous surgeries.    Medications: The patient takes no medications other than for vitamins.    Allergies: The patient says that she has no known drug allergies.    Social History: The patient denies smoking. She denies consuming alcoholic  beverages. She denies the use of recreational drugs.    Review of Systems  General: The patient denies any fevers, chills, sweats.  Pulmonary: The patient denies any coughing, wheezing, chest pain, shortness of breath.  Cardiovascular: The patient denies any palpitations, dyspnea, chest pain.  Gastrointestinal: The patient denies any nausea, vomiting, diarrhea, constipation, hematochezia, melena, history of hepatitis, history of jaundice.  Genitourinary: The patient complains of frequent and painful uterine contractions. She denies any leakage of fluid per vagina. She denies any vaginal bleeding.  Musculoskeletal: The patient denies any arthralgias or myalgias other than for hip pain and low back pain.   Neurological: No headaches or syncope or seizures.     Physical Exam:   Vital Signs: The patient's vital signs are stable and she is afebrile.  General: The patient appears well developed and well nourished and relaxed and alert and comfortable and in no apparent distress.    HEENT :  Normo-cephalic, atraumatic, pupils equal, round, reactive to light and accommodation, extra ocular motions intact, pharynx clear; there is no thyromegaly. There is no cervical lymphadenopathy.  Chest: Heart regular rate and rhythm, with no murmurs or rubs or gallops; the lungs are clear to auscultation bilaterally.  Abdomen: The abdomen is gravid and the fundal height measurement yesterday was 34 cm.   Pelvic: The cervix is 5 cm dilated.  Extremities: No clubbing or cyanosis or edema.   Neurological: non-focal.     Assessment:   1.) Intrauterine pregnancy at 38 weeks and 2 days gestation.  2.) Labor.    Plan:   We will continue electronic fetal monitoring and event analgesia as needed and anticipate an obstetrical delivery.            ________________________  Jefferson Cook M.D.

## 2020-09-25 NOTE — ANESTHESIA PREPROCEDURE EVALUATION
Relevant Problems   ANESTHESIA (within normal limits)      PULMONARY (within normal limits)      NEURO (within normal limits)      CARDIAC (within normal limits)      GI (within normal limits)       (within normal limits)      ENDO (within normal limits)      OB (within normal limits)       Physical Exam    Airway   Mallampati: II  TM distance: >3 FB  Neck ROM: full       Cardiovascular - normal exam  Rhythm: regular  Rate: normal  (-) murmur     Dental - normal exam           Pulmonary - normal exam  Breath sounds clear to auscultation     Abdominal    Neurological - normal exam                 Anesthesia Plan    ASA 2       Plan - epidural   Neuraxial block will be labor analgesia              Pertinent diagnostic labs and testing reviewed    Informed Consent:    Anesthetic plan and risks discussed with patient.

## 2020-09-25 NOTE — CARE PLAN
Problem: Altered physiologic condition related to immediate post-delivery state and potential for bleeding/hemorrhage  Goal: Patient physiologically stable as evidenced by normal lochia, palpable uterine involution and vital signs within normal limits  Outcome: PROGRESSING AS EXPECTED  Note: Fundus is firm, lochia is light and vital signs are stable. Will continue to monitor with Q6 hour checks and patient rounding.     Problem: Potential for postpartum infection related to presence of episiotomy/vaginal tear and/or uterine contamination  Goal: Patient will be absent from signs and symptoms of infection  Outcome: PROGRESSING AS EXPECTED  Note: Patient does not exhibit any signs/symptoms of infection and is afebrile. Will continue to monitor with Q6 hour checks and patient rounding

## 2020-09-25 NOTE — PROGRESS NOTES
Position: SOCORRO  Placenta: spontaneous, intact, 3VC  Lac: none  EBL: 300cc  Complications: none  Apgars: 8 and 9  Male  DICTATED  # [id number]  awestfall

## 2020-09-25 NOTE — PROGRESS NOTES
2200: Assumed care of patient, Report Received from ARNAV Milan RN. Morris, Gestation 38.0.     Patient in bed, awake, RN at bedside, denies pain, pleasant affect. Epidural infusing. FOB at Bedside. POC discussed, all questions answered.     Patient would like FOB at bedside for delivery phase. Patient would like skin to skin, FOB to cut cord after pulsating, breast feeding with Similac as necessary.     EFM used, discussed and in place.     Patient and family deny questions regarding care since arriving at [facility]. Dry erase board updated with RN contact information, discussed. Patient and family encouraged to call RN with all questions, concerns and needs.     2225: ARIANNE Tolliver at  for AROM; moderate meconium noted. SVE: 6-7/80/-2.  Respiratory Therapist called and notified of meconium and requested presence at delivery.     2345: Pt. Calls out feeling pressure. SVE unchanged: 6-7/80/-2.     0145: Pt. Feeling pressure: SVE: Ant Lip/100/0. Dr. Tolliver phoned in house and updated of current SVE.     0215:  of viable male by Dr. Tolliver - APGARS 8/9.    [place] placed to Mother's abdomen. Baby to radiant warmer, wrapped in warm blankets and handed to Mother. Fundus Firm and @ U.     0400: Patient ambulated with assistance of (2) RNs, voided, paolo care completed. Patient and infant transferred to  via wheelchair. Report amos Aragon, PP RN. Patient in stable condition with firm fundus, and light lochia. ID bands verified.

## 2020-09-25 NOTE — ANESTHESIA PROCEDURE NOTES
Epidural Block    Date/Time: 9/24/2020 9:16 PM  Performed by: Cleveland Burroughs M.D.  Authorized by: Cleveland Burroughs M.D.     Patient Location:  OB  Start Time:  9/24/2020 9:16 PM  End Time:  9/24/2020 9:23 PM  Reason for Block: labor analgesia    patient identified, IV checked, site marked, risks and benefits discussed, surgical consent, monitors and equipment checked, pre-op evaluation and timeout performed    Patient Position:  Sitting  Prep: ChloraPrep, patient draped and sterile technique    Monitoring:  Blood pressure, continuous pulse oximetry and heart rate  Approach:  Midline  Location:  L3-L4  Injection Technique:  KIRA saline  Skin infiltration:  Lidocaine  Strength:  1%  Dose:  3ml  Needle Type:  Tuohy  Needle Gauge:  17 G  Needle Length:  3.5 in  Loss of resistance::  4.5  Catheter Size:  19 G  Catheter at Skin Depth:  9.5  Test Dose Result:  Negative   Easy x 1 attempt

## 2020-09-25 NOTE — PROGRESS NOTES
Pt comfortable with epidural  Cx 6-7/80/-2  AROM- moderate meconium  CTXs q 2-7 min  FHTs reactive, reassuring, category I  Will follow    awestfall

## 2020-09-25 NOTE — PROGRESS NOTES
0700 - Report received from Margo OBREGON RN. Patient care assumed. Chart and orders reviewed  0800 - Pt assessment complete, wnl. Fundus firm with minimal discharge. Pt ambulating to bathroom and voiding without difficulty. Patient denies any dizziness or lightheadedness at this time, any calf pain or tenderness, chest pain or SOB, respiratory symptoms, or recent ill contacts. Kindly reminded patient for her and support person to please wear face masks when staff present in room. Plan of care discussed with patient for the day including infant feeding every 2-3 hours or on demand, pain management, and ambulation in halls. Pain medication plan discussed with patient; patient states she will call if PRN pain medication is wanted. All questions/concerns addressed at this time. Call light within reach, encouraged to call with needs. Will continue with routine postpartum cares.

## 2020-09-25 NOTE — ANESTHESIA TIME REPORT
Anesthesia Start and Stop Event Times     Date Time Event    9/24/2020 2113 Ready for Procedure     2113 Anesthesia Start    9/25/2020 0215 Anesthesia Stop        Responsible Staff  09/24/20 to 09/25/20    Name Role Begin End    Cleveland Burroughs M.D. Anesth 2113 0215        Preop Diagnosis (Free Text):  Pre-op Diagnosis             Preop Diagnosis (Codes):    Post op Diagnosis  Pregnancy      Premium Reason  A. 3PM - 7AM    Comments:

## 2020-09-25 NOTE — PROGRESS NOTES
On call- assuming care for Dr krishnamurthy at this time  Pt states CTXs are increasing  Cx 6/70/-2  CTXs q 7-8  FHTs reactive, reassuring, category I  Pt desires epidural and will have placed  Will follow    awestfall

## 2020-09-26 PROCEDURE — 770002 HCHG ROOM/CARE - OB PRIVATE (112)

## 2020-09-26 PROCEDURE — A9270 NON-COVERED ITEM OR SERVICE: HCPCS | Performed by: SPECIALIST

## 2020-09-26 PROCEDURE — 700102 HCHG RX REV CODE 250 W/ 637 OVERRIDE(OP): Performed by: SPECIALIST

## 2020-09-26 PROCEDURE — A9270 NON-COVERED ITEM OR SERVICE: HCPCS | Performed by: OBSTETRICS & GYNECOLOGY

## 2020-09-26 PROCEDURE — 700102 HCHG RX REV CODE 250 W/ 637 OVERRIDE(OP): Performed by: OBSTETRICS & GYNECOLOGY

## 2020-09-26 RX ADMIN — OXYCODONE HYDROCHLORIDE AND ACETAMINOPHEN 1 TABLET: 5; 325 TABLET ORAL at 20:26

## 2020-09-26 RX ADMIN — DOCUSATE SODIUM 100 MG: 100 CAPSULE ORAL at 20:26

## 2020-09-26 RX ADMIN — IBUPROFEN 600 MG: 600 TABLET ORAL at 20:26

## 2020-09-26 RX ADMIN — OXYCODONE HYDROCHLORIDE AND ACETAMINOPHEN 1 TABLET: 5; 325 TABLET ORAL at 05:38

## 2020-09-26 RX ADMIN — IBUPROFEN 600 MG: 600 TABLET ORAL at 05:39

## 2020-09-26 ASSESSMENT — PAIN DESCRIPTION - PAIN TYPE: TYPE: ACUTE PAIN

## 2020-09-26 ASSESSMENT — EDINBURGH POSTNATAL DEPRESSION SCALE (EPDS)
I HAVE BLAMED MYSELF UNNECESSARILY WHEN THINGS WENT WRONG: NO, NEVER
I HAVE FELT SCARED OR PANICKY FOR NO GOOD REASON: NO, NOT AT ALL
I HAVE BEEN ABLE TO LAUGH AND SEE THE FUNNY SIDE OF THINGS: AS MUCH AS I ALWAYS COULD
I HAVE FELT SAD OR MISERABLE: NO, NOT AT ALL
THE THOUGHT OF HARMING MYSELF HAS OCCURRED TO ME: NEVER
I HAVE LOOKED FORWARD WITH ENJOYMENT TO THINGS: AS MUCH AS I EVER DID
I HAVE BEEN SO UNHAPPY THAT I HAVE BEEN CRYING: NO, NEVER
I HAVE BEEN ANXIOUS OR WORRIED FOR NO GOOD REASON: NO, NOT AT ALL
THINGS HAVE BEEN GETTING ON TOP OF ME: NO, I HAVE BEEN COPING AS WELL AS EVER
I HAVE BEEN SO UNHAPPY THAT I HAVE HAD DIFFICULTY SLEEPING: NOT AT ALL

## 2020-09-26 NOTE — CARE PLAN
Problem: Communication  Goal: The ability to communicate needs accurately and effectively will improve  Outcome: PROGRESSING AS EXPECTED     Problem: Safety  Goal: Will remain free from injury  Outcome: PROGRESSING AS EXPECTED     Problem: Infection  Goal: Will remain free from infection  Outcome: PROGRESSING AS EXPECTED     Problem: Knowledge Deficit  Goal: Knowledge of disease process/condition, treatment plan, diagnostic tests, and medications will improve  Outcome: PROGRESSING AS EXPECTED  Goal: Knowledge of the prescribed therapeutic regimen will improve  Outcome: PROGRESSING AS EXPECTED     Problem: Safety  Goal: Free from accidental injury  Outcome: PROGRESSING AS EXPECTED  Goal: Free from intentional harm  Outcome: PROGRESSING AS EXPECTED     Problem: Pain  Goal: Alleviation of Pain or a reduction in pain to the patient's comfort goal  Outcome: PROGRESSING AS EXPECTED

## 2020-09-26 NOTE — DISCHARGE SUMMARY
Discharge Summary:      Wade Bergeron    Admit Date:   2020  Discharge Date:  2020     Admitting diagnosis:  Pregnancy  Labor and delivery, indication for care  Discharge Diagnosis: Status post vaginal, spontaneous.  Pregnancy Complications: none  Tubal Ligation:  no        History:  History reviewed. No pertinent past medical history.  OB History    Para Term  AB Living   6 4 4   2 4   SAB TAB Ectopic Molar Multiple Live Births   2       0 1      # Outcome Date GA Lbr Harvey/2nd Weight Sex Delivery Anes PTL Lv   6 Term 20 38w1d  2.825 kg (6 lb 3.7 oz) M Vag-Spont EPI N CINTHYA   5 Term            4 Term            3 SAB            2 SAB            1 Term                 Patient has no known allergies.  Patient Active Problem List    Diagnosis Date Noted   • Indication for care or intervention related to labor and delivery 2014   • Late prenatal care @ 22 wks  2014        Hospital Course:   25 y.o. , now para 4, was admitted with the above mentioned diagnosis, underwent Active Labor, vaginal, spontaneous. Patient postpartum course was unremarkable, with progressive advancement in diet , ambulation and toleration of oral analgesia. Patient without complaints today and desires discharge.      Vitals:    20 1000 20 1400 20 1800 20 0522   BP: 108/59 100/59 104/65 121/70   Pulse: 60 64 62 65   Resp: 16 16 16 17   Temp: 36.7 °C (98 °F) 36.6 °C (97.9 °F) 37.1 °C (98.7 °F) 37.1 °C (98.7 °F)   TempSrc: Oral Temporal Temporal Temporal   SpO2: 98% 96% 99% 98%   Weight:       Height:           Current Facility-Administered Medications   Medication Dose   • ondansetron (ZOFRAN ODT) dispertab 4 mg  4 mg    Or   • ondansetron (ZOFRAN) syringe/vial injection 4 mg  4 mg   • oxytocin (PITOCIN) infusion (for postpartum)   mL/hr   • ibuprofen (MOTRIN) tablet 600 mg  600 mg   • oxyCODONE-acetaminophen (PERCOCET) 5-325 MG per tablet 1 Tab  1 Tab   •  oxyCODONE-acetaminophen (PERCOCET-10)  MG per tablet 1 Tab  1 Tab   • LR infusion     • tetanus-dipth-acell pertussis (Tdap) inj 0.5 mL  0.5 mL   • measles, mumps and rubella vaccine (MMR) injection 0.5 mL  0.5 mL   • PRN oxytocin (PITOCIN) (20 Units/1000 mL) PRN for excessive uterine bleeding - See Admin Instr  125-999 mL/hr   • miSOPROStol (CYTOTEC) tablet 800 mcg  800 mcg   • docusate sodium (COLACE) capsule 100 mg  100 mg       Exam:  Breast Exam: negative  Abdomen: Abdomen soft, non-tender. BS normal. No masses,  No organomegaly  Fundus Non Tender: yes  Perineum: perineum intact  Extremity: extremities, peripheral pulses and reflexes normal     Labs:  Recent Labs     09/24/20  1510 09/25/20  1131   WBC 8.4 10.5   RBC 4.23 3.81*   HEMOGLOBIN 12.8 11.6*   HEMATOCRIT 38.8 34.7*   MCV 91.7 91.1   MCH 30.3 30.4   MCHC 33.0* 33.4*   RDW 46.3 45.7   PLATELETCT 165 144*   MPV 11.9 11.7        Activity:   Discharge to home  Pelvic Rest x 6 weeks    Assessment:  normal postpartum course  Discharge Assessment: No heavy bleeding or foul vaginal discharge      Follow up: Dr Cook 6-8 weeks PP      Discharge Meds:   No current outpatient medications on file.       Kelly Dominguez M.D.

## 2020-09-26 NOTE — PROGRESS NOTES
Dr. Angel called and notified that patient is breast feeding and is requesting to stay because infant has to stay for phototherapy. Discharge for today cancelled.

## 2020-09-26 NOTE — PROGRESS NOTES
1625- Bedside report received from HARRISON Martinez.  Patient denied needs and declined offer for pain intervention at this time.  Infant in NBN.

## 2020-09-26 NOTE — PROGRESS NOTES
2000 Assessment completed, fundus firm, lochia light, POC reviewed, verbalized understanding. Denies pain at this time, will call if pain med intervention needed.

## 2020-09-26 NOTE — LACTATION NOTE
@0941  met with MOB for follow-up visit, MOB states baby has been in NBN for phototherapy, she states baby has seemed sleepy and has not been latching as well as he was initially, support and encouragement provided, educated on the impact of jaundice on infant's wakefulness for breastfeeding, assured her that as jaundice improves baby will likely begin breastfeeding better again,  educated MOB that she can begin using breast pump to provide adequate breast stimulation if baby is not breastfeeding effectively, MOB states desire to start using breast pump, breast pump set up,  provided education on proper pump use and settings, instructed to decrease speed from 80-60 after 2 minutes and to set suction to highest level that is still comfortable, instructed to pump Q 3 hours for 15 minutes if baby is not latching, MOB states understanding of all instructions provided, MOB was eating breakfast while instructions were provided, she denies to begin pumping at this time, dish soap and education on how to properly clean pump parts also provided, MOB instructed to call for latch assistance as needed, she denies having any unaddressed questions for lactation at this time.

## 2020-09-27 VITALS
WEIGHT: 130 LBS | HEIGHT: 62 IN | TEMPERATURE: 97.8 F | OXYGEN SATURATION: 95 % | RESPIRATION RATE: 17 BRPM | BODY MASS INDEX: 23.92 KG/M2 | HEART RATE: 60 BPM | DIASTOLIC BLOOD PRESSURE: 70 MMHG | SYSTOLIC BLOOD PRESSURE: 105 MMHG

## 2020-09-27 PROCEDURE — A9270 NON-COVERED ITEM OR SERVICE: HCPCS | Performed by: SPECIALIST

## 2020-09-27 PROCEDURE — 700102 HCHG RX REV CODE 250 W/ 637 OVERRIDE(OP): Performed by: SPECIALIST

## 2020-09-27 RX ADMIN — OXYCODONE HYDROCHLORIDE AND ACETAMINOPHEN 1 TABLET: 10; 325 TABLET ORAL at 05:32

## 2020-09-27 RX ADMIN — IBUPROFEN 600 MG: 600 TABLET ORAL at 05:32

## 2020-09-27 NOTE — DISCHARGE SUMMARY
Discharge Summary:      Wade Bergeron    Admit Date:   2020  Discharge Date:   discharge was canceled  to baby staying      Admitting diagnosis:  Pregnancy  Labor and delivery, indication for care  Discharge Diagnosis: Status post vaginal, spontaneous.  Pregnancy Complications: late entry  Tubal Ligation:  no    History:  History reviewed. No pertinent past medical history.  OB History    Para Term  AB Living   6 4 4   2 4   SAB TAB Ectopic Molar Multiple Live Births   2       0 1      # Outcome Date GA Lbr Harvey/2nd Weight Sex Delivery Anes PTL Lv   6 Term 20 38w1d  2.825 kg (6 lb 3.7 oz) M Vag-Spont EPI N CINTHYA   5 Term            4 Term            3 SAB            2 SAB            1 Term                 Patient has no known allergies.  Patient Active Problem List    Diagnosis Date Noted   • Indication for care or intervention related to labor and delivery 2014   • Late prenatal care @ 22 wks  2014        Hospital Course:   25 y.o. , now para 4, was admitted with the above mentioned diagnosis, underwent Active Labor, vaginal, spontaneous. Patient postpartum course was unremarkable, with progressive advancement in diet , ambulation and toleration of oral analgesia. Patient without complaints today and desires discharge.      Vitals:    20 1800 20 0522 20 1742 20 0600   BP: 104/65 121/70 110/68 105/70   Pulse: 62 65 63 60   Resp: 16 17 17 17   Temp: 37.1 °C (98.7 °F) 37.1 °C (98.7 °F) 37.3 °C (99.1 °F) 36.6 °C (97.8 °F)   TempSrc: Temporal Temporal Temporal Temporal   SpO2: 99% 98% 97% 95%   Weight:       Height:           Current Facility-Administered Medications   Medication Dose   • ondansetron (ZOFRAN ODT) dispertab 4 mg  4 mg    Or   • ondansetron (ZOFRAN) syringe/vial injection 4 mg  4 mg   • oxytocin (PITOCIN) infusion (for postpartum)   mL/hr   • ibuprofen (MOTRIN) tablet 600 mg  600 mg   • oxyCODONE-acetaminophen  (PERCOCET) 5-325 MG per tablet 1 Tab  1 Tab   • oxyCODONE-acetaminophen (PERCOCET-10)  MG per tablet 1 Tab  1 Tab   • LR infusion     • tetanus-dipth-acell pertussis (Tdap) inj 0.5 mL  0.5 mL   • measles, mumps and rubella vaccine (MMR) injection 0.5 mL  0.5 mL   • PRN oxytocin (PITOCIN) (20 Units/1000 mL) PRN for excessive uterine bleeding - See Admin Instr  125-999 mL/hr   • miSOPROStol (CYTOTEC) tablet 800 mcg  800 mcg   • docusate sodium (COLACE) capsule 100 mg  100 mg       Exam:  Breast Exam: negative  Abdomen: Abdomen soft, non-tender. BS normal. No masses,  No organomegaly  Fundus Non Tender: yes  Perineum: perineum intact  Extremity: extremities, peripheral pulses and reflexes normal     Labs:  Recent Labs     09/24/20  1510 09/25/20  1131   WBC 8.4 10.5   RBC 4.23 3.81*   HEMOGLOBIN 12.8 11.6*   HEMATOCRIT 38.8 34.7*   MCV 91.7 91.1   MCH 30.3 30.4   MCHC 33.0* 33.4*   RDW 46.3 45.7   PLATELETCT 165 144*   MPV 11.9 11.7        Activity:   Discharge to home  Pelvic Rest x 6 weeks    Assessment:  normal postpartum course  Discharge Assessment: Taking adequate diet and fluids     Follow up: 6 weeks PP with Dr Cook      Discharge Meds:   No current outpatient medications on file.       Kelly Dominguez M.D.

## 2020-09-27 NOTE — PROGRESS NOTES
Report received from Maria Del Rosario @ 9152. Discussed plan of care.  Assessment done. Denies pain. Encouraged to call if with need. Will check at intervals.

## 2020-09-27 NOTE — PROGRESS NOTES
Assumed care of patient. Assessment complete. Fundus is firm, with scant lochia rubra. Pain level is 8/10, medicated per MAR. Bed is locked and in low position. Call light left within reach and encouraged to call for any needs if necessary.

## 2020-09-27 NOTE — DISCHARGE PLANNING
Discharge Planning Assessment Post Partum    Reason for Referral: History of THC  Address: [address] [place] [address] [place] [address]  Phone: [phone]  Mom Diagnosis: Pregnancy  Baby Diagnosis: -38.1 weeks  Primary Language: English    Name of Baby: Nasir Berman (: 20)  Father of the Baby: Marquise Berman   Involved in baby’s care? Yes  Contact Information: [phone]    Prenatal Care: Yes  Mom's PCP: None  PCP for new baby: Pediatrician list provided    Support System: FOB  Coping/Bonding between mother & baby: Yes  Source of Feeding: breast and bottle  Supplies for Infant: prepared for infant; denies any needs    Mom's Insurance: [employer] Medicaid  Baby Covered on Insurance:Yes  Mother Employed/School: Not currently  Other children in the home/names & ages: MOB has three other children    Financial Hardship/Income: denies    Mom's Mental status: alert and oriented  Services used prior to admit: Medicaid    CPS History: No  Psychiatric History: No  Domestic Violence History: No  Drug/ETOH History: history of THC use.  Discussed with MOB who denies use during pregnancy.  Infant's UDS is negative.    Resources Provided: pediatrician list, children and family resource list, and post partum support and counseling resources provided  Referrals Made: diaper bank referral provided     Clearance for Discharge: Infant is cleared to discharge home with mother.

## 2020-09-27 NOTE — CARE PLAN
Problem: Altered physiologic condition related to immediate post-delivery state and potential for bleeding/hemorrhage  Goal: Patient physiologically stable as evidenced by normal lochia, palpable uterine involution and vital signs within normal limits  Outcome: PROGRESSING AS EXPECTED  Note: Assessment done. Fundus firm with scant lochia noted.      Problem: Alteration in comfort related to episiotomy, vaginal repair and/or after birth pains  Goal: Patient verbalizes acceptable pain level  Outcome: PROGRESSING AS EXPECTED  Note: Denies pain at this time.   Encouraged to call if needed pain medication.

## 2020-09-27 NOTE — DISCHARGE INSTRUCTIONS
POSTPARTUM DISCHARGE INSTRUCTIONS FOR MOM    YOB: 1995   Age: 25 y.o.               Admit Date: 9/24/2020     Discharge Date: 9/27/2020  Attending Doctor:  Jefferson Cook M.D.                  Allergies:  Patient has no known allergies.    Discharged to home by car. Discharged via wheelchair, hospital escort: Yes.  Special equipment needed: Not Applicable  Belongings with: Personal  Be sure to schedule a follow-up appointment with your primary care doctor or any specialists as instructed.     Discharge Plan:   Diet Plan: Discussed  Activity Level: Discussed  Smoking Cessation Offered: Patient Refused  Confirmed Follow up Appointment: Patient to Call and Schedule Appointment  Medication Reconciliation Updated: Yes  Influenza Vaccine Indication: Patient Refuses    REASONS TO CALL YOUR OBSTETRICIAN:  1.   Persistent fever or shaking chills (Temperature higher than 100.4)  2.   Heavy bleeding (soaking more than 1 pad per hour); Passing clots  3.   Foul odor from vagina  4.   Mastitis (Breast infection; breast pain, chills, fever, redness)  5.   Urinary pain, burning or frequency  6.   Episiotomy infection  7.   Severe depression longer than 24 hours    HAND WASHING  · Prior to handling the baby.  · Before breastfeeding or bottle feeding baby.  · After using the bathroom or changing the baby's diaper.    VAGINAL CARE  · Nothing inside vagina for 6 weeks: no sexual intercourse, tampons or douching.  · Bleeding may continue for 2-4 weeks.  Amount may vary.    · Call your physician for heavy bleeding which means soaking more than 1 pad per hour    BIRTH CONTROL  · It is possible to become pregnant at any time after delivery and while breastfeeding.  · Plan to discuss a method of birth control with your physician at your follow up visit. visit.    DIET AND ELIMINATION  · Eating more fiber (bran cereal, fruits, and vegetables) and drinking plenty of fluids will help to avoid constipation.  · Urinary frequency  "after childbirth is normal.    POSTPARTUM BLUES  During the first few days after birth, you may experience a sense of the \"blues\" which may include impatience, irritability or even crying.  These feeling come and go quickly.  However, as many as 1 in 10 women experience emotional symptoms known as postpartum depression.    Postpartum depression:  May start as early as the second or third day after delivery or take several weeks or months to develop.  Symptoms of \"blues\" are present, but are more intense:  Crying spells; loss of appetite; feelings of hopelessness or loss of control; fear of touching the baby; over concern or no concern at all about the baby; little or no concern about your own appearance/caring for yourself; and/or inability to sleep or excessive sleeping.  Contact your physician if you are experiencing any of these symptoms.    Crisis Hotline:  · [place] Crisis Hotline:  7-369-UERQOPH  Or 1-646.316.1521  · [place] Crisis Hotline:  1-691.791.5252  Or 983-890-8016    PREVENTING SHAKEN BABY:  If you are angry or stressed, PUT THE BABY IN THE CRIB, step away, take some deep breaths, and wait until you are calm to care for the baby.  DO NOT SHAKE THE BABY.  You are not alone, call a supporter for help.    · Crisis Call Center 24/7 crisis line 022-525-3567 or 1-658.163.1185  · You can also text them, text \"ANSWER\" to 640951    QUIT SMOKING/TOBACCO USE:  I understand the use of any tobacco products increases my chance of suffering from future heart disease and could cause other illnesses which may shorten my life. Quitting the use of tobacco products is the single most important thing I can do to improve my health. For further information on smoking / tobacco cessation call a Toll Free Quit Line at 1-548.758.4609 (*[employer] [place]) or 1-329.532.1927 (American Lung Association) or you can access the web based program at www.lungusa.org.    · [place] Tobacco Users Help Line:  (688) 488-7309       " Toll Free: 2-387-287-3109  · Quit Tobacco Program [facility] (565)354-3147    DEPRESSION / SUICIDE RISK:  As you are discharged from this [facility], it is important to learn how to keep safe from harming yourself.    Recognize the warning signs:  · Abrupt changes in personality, positive or negative- including increase in energy   · Giving away possessions  · Change in eating patterns- significant weight changes-  positive or negative  · Change in sleeping patterns- unable to sleep or sleeping all the time   · Unwillingness or inability to communicate  · Depression  · Unusual sadness, discouragement and loneliness  · Talk of wanting to die  · Neglect of personal appearance   · Rebelliousness- reckless behavior  · Withdrawal from people/activities they love  · Confusion- inability to concentrate     If you or a loved one observes any of these behaviors or has concerns about self-harm, here's what you can do:  · Talk about it- your feelings and reasons for harming yourself  · Remove any means that you might use to hurt yourself (examples: pills, rope, extension cords, firearm)  · Get professional help from the community (Mental Health, Substance Abuse, psychological counseling)  · Do not be alone:Call your Safe Contact- someone whom you trust who will be there for you.  · Call your local CRISIS HOTLINE 575-4271 or 754-559-2286  · Call your local Children's Mobile Crisis Response Team [place] (761) 445-1054 or www."Hey, Neighbor!"  · Call the toll free National Suicide Prevention Hotlines   · National Suicide Prevention Lifeline 442-772-IQQW (5431)  · National Hope Line Network 800-SUICIDE (890-7062)    DISCHARGE SURVEY:  Thank you for choosing [facility].  We hope we provided you with very good care.  You may be receiving a survey in the mail.  Please fill it out.  Your opinion is valuable to us.    ADDITIONAL EDUCATIONAL MATERIALS GIVEN TO PATIENT:        My signature on  this form indicates that:  1.  I have reviewed and understand the above information  2.  My questions regarding this information have been answered to my satisfaction.  3.  I have formulated a plan with my discharge nurse to obtain my prescribed medication for home.

## 2020-09-27 NOTE — LACTATION NOTE
This note was copied from a baby's chart.  BSAILIO met with MOB today for follow-up appointment, MOB states baby is still in the NBN for phototherapy, TBili=13.9 this morning (down from 14.6 yesterday morning and 14 yesterday afternoon), MOB states she is planning to room-in tonight since baby will not be discharged, MOB states she used breast pump once or twice but has generally not been using it, she denies pain when she has used breast pump, she declines to pump at this time, discussed supply and demand in relation to milk supply, reminded of the importance of pumping frequently and on a schedule if baby is not breastfeeding or is not breastfeeding effectively, verified continued understanding of proper pump use and settings, encouraged to pump Q 3 hours for 15 minutes if baby does not breastfeed/does not breastfeed effectively    Plan:  Q 3 hour attempt to breastfeed  Pump for 15 minutes  Supplement with EBM/formula per guidelines    MOB states she does not have a breast pump for home, MOB states she has [address] [place], education provided regarding assistance available at [facility] after discharge, WIC pump paperwork provided, instructed to  WIC pump from [facility] prior to discharge if baby continues having breastfeeding difficulties    Encouraged to call for assistance as needed

## 2023-01-11 NOTE — CARE PLAN
Problem: Altered physiologic condition related to immediate post-delivery state and potential for bleeding/hemorrhage  Goal: Patient physiologically stable as evidenced by normal lochia, palpable uterine involution and vital signs within normal limits  Outcome: PROGRESSING AS EXPECTED  Note: Fundus firm, lochia is scant and rubra. No clots at this time. Vitals stable.      Problem: Potential for postpartum infection related to presence of episiotomy/vaginal tear and/or uterine contamination  Goal: Patient will be absent from signs and symptoms of infection  Outcome: PROGRESSING AS EXPECTED  Note: No s/s of infection present on assessment. No fevers.       photos taken consents signed.

## 2023-01-14 ENCOUNTER — HOSPITAL ENCOUNTER (INPATIENT)
Facility: MEDICAL CENTER | Age: 28
LOS: 6 days | End: 2023-01-20
Attending: SPECIALIST | Admitting: SPECIALIST
Payer: COMMERCIAL

## 2023-01-14 ENCOUNTER — APPOINTMENT (OUTPATIENT)
Dept: OBGYN | Facility: MEDICAL CENTER | Age: 28
End: 2023-01-14
Attending: SPECIALIST
Payer: COMMERCIAL

## 2023-01-14 DIAGNOSIS — D62 POSTOPERATIVE ANEMIA DUE TO ACUTE BLOOD LOSS: ICD-10-CM

## 2023-01-14 DIAGNOSIS — G89.18 POSTOPERATIVE PAIN: ICD-10-CM

## 2023-01-14 DIAGNOSIS — K59.01 CONSTIPATION BY DELAYED COLONIC TRANSIT: ICD-10-CM

## 2023-01-14 LAB
ABO GROUP BLD: NORMAL
BASOPHILS # BLD AUTO: 0.1 % (ref 0–1.8)
BASOPHILS # BLD: 0.01 K/UL (ref 0–0.12)
BLD GP AB SCN SERPL QL: NORMAL
EOSINOPHIL # BLD AUTO: 0.02 K/UL (ref 0–0.51)
EOSINOPHIL NFR BLD: 0.2 % (ref 0–6.9)
ERYTHROCYTE [DISTWIDTH] IN BLOOD BY AUTOMATED COUNT: 42.7 FL (ref 35.9–50)
HBV SURFACE AG SER QL: ABNORMAL
HCT VFR BLD AUTO: 35.9 % (ref 37–47)
HCV AB SER QL: ABNORMAL
HGB BLD-MCNC: 12.2 G/DL (ref 12–16)
HIV 1+2 AB+HIV1 P24 AG SERPL QL IA: NORMAL
IMM GRANULOCYTES # BLD AUTO: 0.08 K/UL (ref 0–0.11)
IMM GRANULOCYTES NFR BLD AUTO: 0.9 % (ref 0–0.9)
LYMPHOCYTES # BLD AUTO: 1.83 K/UL (ref 1–4.8)
LYMPHOCYTES NFR BLD: 21.6 % (ref 22–41)
MCH RBC QN AUTO: 30.6 PG (ref 27–33)
MCHC RBC AUTO-ENTMCNC: 34 G/DL (ref 33.6–35)
MCV RBC AUTO: 90 FL (ref 81.4–97.8)
MONOCYTES # BLD AUTO: 0.62 K/UL (ref 0–0.85)
MONOCYTES NFR BLD AUTO: 7.3 % (ref 0–13.4)
NEUTROPHILS # BLD AUTO: 5.91 K/UL (ref 2–7.15)
NEUTROPHILS NFR BLD: 69.9 % (ref 44–72)
NRBC # BLD AUTO: 0 K/UL
NRBC BLD-RTO: 0 /100 WBC
PLATELET # BLD AUTO: 178 K/UL (ref 164–446)
PMV BLD AUTO: 11.6 FL (ref 9–12.9)
RBC # BLD AUTO: 3.99 M/UL (ref 4.2–5.4)
RH BLD: NORMAL
RUBV AB SER QL: 234 IU/ML
T PALLIDUM AB SER QL IA: ABNORMAL
WBC # BLD AUTO: 8.5 K/UL (ref 4.8–10.8)

## 2023-01-14 PROCEDURE — 86803 HEPATITIS C AB TEST: CPT

## 2023-01-14 PROCEDURE — 87389 HIV-1 AG W/HIV-1&-2 AB AG IA: CPT

## 2023-01-14 PROCEDURE — A9270 NON-COVERED ITEM OR SERVICE: HCPCS | Performed by: SPECIALIST

## 2023-01-14 PROCEDURE — 86900 BLOOD TYPING SEROLOGIC ABO: CPT

## 2023-01-14 PROCEDURE — 86901 BLOOD TYPING SEROLOGIC RH(D): CPT

## 2023-01-14 PROCEDURE — 36415 COLL VENOUS BLD VENIPUNCTURE: CPT

## 2023-01-14 PROCEDURE — 86592 SYPHILIS TEST NON-TREP QUAL: CPT

## 2023-01-14 PROCEDURE — 86762 RUBELLA ANTIBODY: CPT

## 2023-01-14 PROCEDURE — 85025 COMPLETE CBC W/AUTO DIFF WBC: CPT

## 2023-01-14 PROCEDURE — 3E0P7VZ INTRODUCTION OF HORMONE INTO FEMALE REPRODUCTIVE, VIA NATURAL OR ARTIFICIAL OPENING: ICD-10-PCS | Performed by: SPECIALIST

## 2023-01-14 PROCEDURE — 770002 HCHG ROOM/CARE - OB PRIVATE (112)

## 2023-01-14 PROCEDURE — 86850 RBC ANTIBODY SCREEN: CPT

## 2023-01-14 PROCEDURE — 86780 TREPONEMA PALLIDUM: CPT

## 2023-01-14 PROCEDURE — 87340 HEPATITIS B SURFACE AG IA: CPT

## 2023-01-14 PROCEDURE — 700102 HCHG RX REV CODE 250 W/ 637 OVERRIDE(OP): Performed by: SPECIALIST

## 2023-01-14 RX ORDER — ONDANSETRON 4 MG/1
4 TABLET, ORALLY DISINTEGRATING ORAL EVERY 6 HOURS PRN
Status: DISCONTINUED | OUTPATIENT
Start: 2023-01-14 | End: 2023-01-16 | Stop reason: HOSPADM

## 2023-01-14 RX ORDER — OXYTOCIN 10 [USP'U]/ML
10 INJECTION, SOLUTION INTRAMUSCULAR; INTRAVENOUS
Status: DISCONTINUED | OUTPATIENT
Start: 2023-01-14 | End: 2023-01-16 | Stop reason: HOSPADM

## 2023-01-14 RX ORDER — ACETAMINOPHEN 500 MG
1000 TABLET ORAL
Status: DISCONTINUED | OUTPATIENT
Start: 2023-01-14 | End: 2023-01-16 | Stop reason: HOSPADM

## 2023-01-14 RX ORDER — SODIUM CHLORIDE, SODIUM LACTATE, POTASSIUM CHLORIDE, CALCIUM CHLORIDE 600; 310; 30; 20 MG/100ML; MG/100ML; MG/100ML; MG/100ML
INJECTION, SOLUTION INTRAVENOUS CONTINUOUS
Status: DISCONTINUED | OUTPATIENT
Start: 2023-01-14 | End: 2023-01-20 | Stop reason: HOSPADM

## 2023-01-14 RX ORDER — MISOPROSTOL 200 UG/1
800 TABLET ORAL
Status: DISCONTINUED | OUTPATIENT
Start: 2023-01-14 | End: 2023-01-16 | Stop reason: HOSPADM

## 2023-01-14 RX ORDER — ONDANSETRON 2 MG/ML
4 INJECTION INTRAMUSCULAR; INTRAVENOUS EVERY 6 HOURS PRN
Status: DISCONTINUED | OUTPATIENT
Start: 2023-01-14 | End: 2023-01-16 | Stop reason: HOSPADM

## 2023-01-14 RX ORDER — IBUPROFEN 800 MG/1
800 TABLET ORAL
Status: DISCONTINUED | OUTPATIENT
Start: 2023-01-14 | End: 2023-01-16 | Stop reason: HOSPADM

## 2023-01-14 RX ADMIN — MISOPROSTOL 25 MCG: 100 TABLET ORAL at 12:28

## 2023-01-14 ASSESSMENT — PATIENT HEALTH QUESTIONNAIRE - PHQ9
1. LITTLE INTEREST OR PLEASURE IN DOING THINGS: NOT AT ALL
2. FEELING DOWN, DEPRESSED, IRRITABLE, OR HOPELESS: NOT AT ALL
SUM OF ALL RESPONSES TO PHQ9 QUESTIONS 1 AND 2: 0

## 2023-01-14 ASSESSMENT — LIFESTYLE VARIABLES
EVER_SMOKED: YES
ALCOHOL_USE: NO

## 2023-01-14 NOTE — H&P
Wade Bergeron         YOB: 1995  Date of today's admission: Saturday, January 14, 2023  Facility: [facility] Labor & Delivery    ID: The patient is a very pleasant 28-year-old multipara (para 4 with 4 previous vaginal deliveries) who is today 37 weeks and 5 days gestation.    Chief Complaint: The patient has no complaints other than occasional contractions and generalized discomfort consistent with term pregnancy.    History of Present Illness: The patient has had prenatal care with myself during the course of this pregnancy.  She has also been seen during the course of this pregnancy by perinatology, [facility].  Perinatologist Dr. Violet Feliz (at [facility]) has diagnosed intrauterine growth restriction.  Dr. Feliz performed an ultrasound on December 30 (15 days ago) and in her report Dr. Feliz states that the estimated fetal weight was 2,217 grams and that this corresponded to the 8th percentile.  In her report Dr. Feliz stated that this ultrasound revealed that the abdominal circumference was in the 4th percentile.  In her report Dr. Feliz stated that umbilical artery Doppler studies were performed and that the S/D ratio was abnormally elevated at 4.03 and in her report Dr. Feliz stated that she recommended delivery at 37 to 37-1/2 weeks gestation because of intrauterine growth restriction.  And so the patient is admitted today for induction of labor.  Of note her recent vaginal culture for group B strep came back negative for group B strep.  Also of note on admission her cervix was found to be 1 cm dilated and thick and high.  The fetal heart tracing is category 1.    Past Medical History: The patient says that she has no medical illnesses.    Past Surgical History: The patient says that she has had no previous surgeries.    Medications: The patient says that she takes no medications other than for prenatal  vitamins.    Allergies: The patient says that she has no known drug allergies.    Social History: The patient smokes cigarettes.  She denies consuming alcoholic beverages.  She denies the use of recreational drugs.    Review of Systems  General: The patient denies any fevers, chills, sweats.  Pulmonary: The patient denies any coughing, wheezing, chest pain, shortness of breath.  Cardiovascular: The patient denies any palpitations, dyspnea, chest pain.  Gastrointestinal: The patient denies any nausea, vomiting, diarrhea, constipation, hematochezia, melena, history of hepatitis, history of jaundice.  Genitourinary: The patient says she feels occasional uterine contractions.  She denies vaginal bleeding.  She denies any leakage of fluid per vagina.  She says she has been experiencing fetal movement.  Musculoskeletal: The patient denies any arthralgias or myalgias other than for hip pain and low back pain.   Neurological: No headaches or syncope or seizures.     Physical Exam:   Vital Signs: The patient's vital signs are stable and she is afebrile.  General: The patient appears well developed and well nourished and relaxed and alert and comfortable and in no apparent distress.    HEENT :  Normo-cephalic, atraumatic, pupils equal, round, reactive to light and accommodation, extra ocular motions intact, pharynx clear; there is no thyromegaly. There is no cervical lymphadenopathy.  Chest: Heart regular rate and rhythm, with no murmurs or rubs or gallops; the lungs are clear to auscultation bilaterally.  Abdomen: The abdomen is gravid and the fundal height measurement was 33 cm.   Pelvic: The cervix is essentially long and closed and high.  Extremities: No clubbing or cyanosis or edema.   Neurological: non-focal.     Assessment:   1.)  Intrauterine pregnancy at 37 weeks and 5 days gestation.  2.)  Intrauterine growth restriction.  3.)  The patient smokes cigarettes.    Plan:   We we will continue electronic fetal monitoring  and give Cytotec 25 mcg per vagina and give analgesia as needed and anticipate an obstetrical delivery.  We will recheck the patient's cervix few hours to several hours after giving the Cytotec and consider Pitocin and/or repeat Cytotec.            ________________________  Jefferson Cook M.D.

## 2023-01-14 NOTE — PROGRESS NOTES
28 y.o.  EDC  (37.5 wks)    Pt presents to L&D for scheduled IOL. Reports +FM and occasional UCs, denies LOF/VB. SVE 1/thick/high. Dr Cook notified. Cytotec ordered, see MAR.

## 2023-01-15 ENCOUNTER — ANESTHESIA EVENT (OUTPATIENT)
Dept: OBGYN | Facility: MEDICAL CENTER | Age: 28
End: 2023-01-15
Payer: COMMERCIAL

## 2023-01-15 ENCOUNTER — ANESTHESIA (OUTPATIENT)
Dept: OBGYN | Facility: MEDICAL CENTER | Age: 28
End: 2023-01-15
Payer: COMMERCIAL

## 2023-01-15 PROCEDURE — 700111 HCHG RX REV CODE 636 W/ 250 OVERRIDE (IP)

## 2023-01-15 PROCEDURE — 700101 HCHG RX REV CODE 250: Performed by: STUDENT IN AN ORGANIZED HEALTH CARE EDUCATION/TRAINING PROGRAM

## 2023-01-15 PROCEDURE — 700102 HCHG RX REV CODE 250 W/ 637 OVERRIDE(OP): Performed by: SPECIALIST

## 2023-01-15 PROCEDURE — A9270 NON-COVERED ITEM OR SERVICE: HCPCS | Performed by: SPECIALIST

## 2023-01-15 PROCEDURE — 770002 HCHG ROOM/CARE - OB PRIVATE (112)

## 2023-01-15 PROCEDURE — 01967 NEURAXL LBR ANES VAG DLVR: CPT | Performed by: STUDENT IN AN ORGANIZED HEALTH CARE EDUCATION/TRAINING PROGRAM

## 2023-01-15 PROCEDURE — 3E033VJ INTRODUCTION OF OTHER HORMONE INTO PERIPHERAL VEIN, PERCUTANEOUS APPROACH: ICD-10-PCS | Performed by: SPECIALIST

## 2023-01-15 PROCEDURE — 303615 HCHG EPIDURAL/SPINAL ANESTHESIA FOR LABOR

## 2023-01-15 PROCEDURE — 700111 HCHG RX REV CODE 636 W/ 250 OVERRIDE (IP): Performed by: SPECIALIST

## 2023-01-15 PROCEDURE — 700111 HCHG RX REV CODE 636 W/ 250 OVERRIDE (IP): Performed by: STUDENT IN AN ORGANIZED HEALTH CARE EDUCATION/TRAINING PROGRAM

## 2023-01-15 PROCEDURE — 700105 HCHG RX REV CODE 258: Performed by: SPECIALIST

## 2023-01-15 RX ORDER — ROPIVACAINE HYDROCHLORIDE 2 MG/ML
INJECTION, SOLUTION EPIDURAL; INFILTRATION; PERINEURAL
Status: COMPLETED
Start: 2023-01-15 | End: 2023-01-15

## 2023-01-15 RX ORDER — BUPIVACAINE HYDROCHLORIDE 2.5 MG/ML
INJECTION, SOLUTION EPIDURAL; INFILTRATION; INTRACAUDAL
Status: COMPLETED
Start: 2023-01-15 | End: 2023-01-15

## 2023-01-15 RX ORDER — LIDOCAINE HYDROCHLORIDE AND EPINEPHRINE 15; 5 MG/ML; UG/ML
INJECTION, SOLUTION EPIDURAL
Status: COMPLETED | OUTPATIENT
Start: 2023-01-15 | End: 2023-01-15

## 2023-01-15 RX ORDER — BUPIVACAINE HYDROCHLORIDE 2.5 MG/ML
INJECTION, SOLUTION EPIDURAL; INFILTRATION; INTRACAUDAL
Status: COMPLETED | OUTPATIENT
Start: 2023-01-15 | End: 2023-01-15

## 2023-01-15 RX ADMIN — ROPIVACAINE HYDROCHLORIDE 200 MG: 2 INJECTION, SOLUTION EPIDURAL; INFILTRATION at 19:59

## 2023-01-15 RX ADMIN — FENTANYL CITRATE 100 MCG: 50 INJECTION, SOLUTION INTRAMUSCULAR; INTRAVENOUS at 19:15

## 2023-01-15 RX ADMIN — OXYTOCIN 2 MILLI-UNITS/MIN: 10 INJECTION, SOLUTION INTRAMUSCULAR; INTRAVENOUS at 16:03

## 2023-01-15 RX ADMIN — SODIUM CHLORIDE, POTASSIUM CHLORIDE, SODIUM LACTATE AND CALCIUM CHLORIDE: 600; 310; 30; 20 INJECTION, SOLUTION INTRAVENOUS at 15:55

## 2023-01-15 RX ADMIN — SODIUM CHLORIDE, POTASSIUM CHLORIDE, SODIUM LACTATE AND CALCIUM CHLORIDE: 600; 310; 30; 20 INJECTION, SOLUTION INTRAVENOUS at 20:34

## 2023-01-15 RX ADMIN — BUPIVACAINE HYDROCHLORIDE 4 ML: 2.5 INJECTION, SOLUTION EPIDURAL; INFILTRATION; INTRACAUDAL; PERINEURAL at 19:15

## 2023-01-15 RX ADMIN — LIDOCAINE HYDROCHLORIDE,EPINEPHRINE BITARTRATE 3 ML: 15; .005 INJECTION, SOLUTION EPIDURAL; INFILTRATION; INTRACAUDAL; PERINEURAL at 19:15

## 2023-01-15 RX ADMIN — MISOPROSTOL 25 MCG: 100 TABLET ORAL at 08:37

## 2023-01-15 ASSESSMENT — PAIN DESCRIPTION - PAIN TYPE: TYPE: ACUTE PAIN

## 2023-01-15 NOTE — PROGRESS NOTES
1240:SVE unchanged. Dr Cook called with update. No new orders at this time. MD to come to bedside, POC discussed with pt and FOB.

## 2023-01-15 NOTE — PROGRESS NOTES
This morning the patient's uterine contractions were noted to have become less frequent.  The fetal heart tracing is category 1.  I checked the patient's cervix this morning and at that time found her cervix to be 2 cm dilated and 60% effaced and -2 station.  I then placed Cytotec 25 mcg per vagina.  We will continue electronic fetal monitoring and recheck the cervix in a few hours to several hours.  We will give analgesia as needed.  I discussed with her this morning the subject of induction of labor and her questions were answered.  Jefferson Cook MD

## 2023-01-15 NOTE — PROGRESS NOTES
1900-Rcvd report from dayshift RN and assumed care of pt.  2235-Dr. Cook called and updated on pt. Orders rcvd to check pt.  SVE=unchanged at 1-2/50-2  Dr. Cook called back and no new orders rcvd.  Will continue to monitor.  0700-report given to dayshift RN

## 2023-01-15 NOTE — PROGRESS NOTES
0700-report received from Vilma TERRY.   6260-Dr Cook at bedside. SVE 2/60/-2, cytotec placed by MD.  4645-report to Angi TERRY

## 2023-01-15 NOTE — PROGRESS NOTES
1515-  Report received from Fransisca TERRY.,   presenting for IOL dur to IUGR. Pt resting comfortably in bed, POC discussed. Education given and all questions answered at this time. EFM and toco adjusted. Pt denies LOF, FM      1540- Dr. Cook in dept. POC discussed, report given. Orders received to start pitocin.      1900- Report given to HARRISON valderrama.

## 2023-01-16 ENCOUNTER — APPOINTMENT (OUTPATIENT)
Dept: RADIOLOGY | Facility: MEDICAL CENTER | Age: 28
End: 2023-01-16
Attending: SPECIALIST
Payer: COMMERCIAL

## 2023-01-16 LAB
APTT PPP: 34 SEC (ref 24.7–36)
BASOPHILS # BLD AUTO: 0.5 % (ref 0–1.8)
BASOPHILS # BLD: 0.06 K/UL (ref 0–0.12)
EOSINOPHIL # BLD AUTO: 0.01 K/UL (ref 0–0.51)
EOSINOPHIL NFR BLD: 0.1 % (ref 0–6.9)
ERYTHROCYTE [DISTWIDTH] IN BLOOD BY AUTOMATED COUNT: 44.2 FL (ref 35.9–50)
FIBRINOGEN PPP-MCNC: 243 MG/DL (ref 215–460)
HCT VFR BLD AUTO: 33.7 % (ref 37–47)
HGB BLD-MCNC: 11.3 G/DL (ref 12–16)
IMM GRANULOCYTES # BLD AUTO: 0.32 K/UL (ref 0–0.11)
IMM GRANULOCYTES NFR BLD AUTO: 2.8 % (ref 0–0.9)
INR PPP: 1.28 (ref 0.87–1.13)
LYMPHOCYTES # BLD AUTO: 0.99 K/UL (ref 1–4.8)
LYMPHOCYTES NFR BLD: 8.8 % (ref 22–41)
MCH RBC QN AUTO: 30.6 PG (ref 27–33)
MCHC RBC AUTO-ENTMCNC: 33.5 G/DL (ref 33.6–35)
MCV RBC AUTO: 91.3 FL (ref 81.4–97.8)
MONOCYTES # BLD AUTO: 0.14 K/UL (ref 0–0.85)
MONOCYTES NFR BLD AUTO: 1.2 % (ref 0–13.4)
NEUTROPHILS # BLD AUTO: 9.76 K/UL (ref 2–7.15)
NEUTROPHILS NFR BLD: 86.6 % (ref 44–72)
NRBC # BLD AUTO: 0.03 K/UL
NRBC BLD-RTO: 0.3 /100 WBC
PLATELET # BLD AUTO: 124 K/UL (ref 164–446)
PMV BLD AUTO: 11.2 FL (ref 9–12.9)
PROTHROMBIN TIME: 15.8 SEC (ref 12–14.6)
RBC # BLD AUTO: 3.69 M/UL (ref 4.2–5.4)
WBC # BLD AUTO: 11.3 K/UL (ref 4.8–10.8)

## 2023-01-16 PROCEDURE — C1755 CATHETER, INTRASPINAL: HCPCS | Performed by: SPECIALIST

## 2023-01-16 PROCEDURE — 10907ZC DRAINAGE OF AMNIOTIC FLUID, THERAPEUTIC FROM PRODUCTS OF CONCEPTION, VIA NATURAL OR ARTIFICIAL OPENING: ICD-10-PCS | Performed by: SPECIALIST

## 2023-01-16 PROCEDURE — 36415 COLL VENOUS BLD VENIPUNCTURE: CPT

## 2023-01-16 PROCEDURE — 74018 RADEX ABDOMEN 1 VIEW: CPT

## 2023-01-16 PROCEDURE — 85384 FIBRINOGEN ACTIVITY: CPT

## 2023-01-16 PROCEDURE — 700111 HCHG RX REV CODE 636 W/ 250 OVERRIDE (IP): Performed by: STUDENT IN AN ORGANIZED HEALTH CARE EDUCATION/TRAINING PROGRAM

## 2023-01-16 PROCEDURE — 160035 HCHG PACU - 1ST 60 MINS PHASE I: Performed by: SPECIALIST

## 2023-01-16 PROCEDURE — 700111 HCHG RX REV CODE 636 W/ 250 OVERRIDE (IP): Performed by: ANESTHESIOLOGY

## 2023-01-16 PROCEDURE — 700105 HCHG RX REV CODE 258: Performed by: SPECIALIST

## 2023-01-16 PROCEDURE — 700102 HCHG RX REV CODE 250 W/ 637 OVERRIDE(OP): Performed by: SPECIALIST

## 2023-01-16 PROCEDURE — 700102 HCHG RX REV CODE 250 W/ 637 OVERRIDE(OP): Performed by: ANESTHESIOLOGY

## 2023-01-16 PROCEDURE — 160009 HCHG ANES TIME/MIN: Performed by: SPECIALIST

## 2023-01-16 PROCEDURE — 01968 ANES/ANALG CS DLVR NEURAXIAL: CPT | Performed by: ANESTHESIOLOGY

## 2023-01-16 PROCEDURE — 700111 HCHG RX REV CODE 636 W/ 250 OVERRIDE (IP): Performed by: SPECIALIST

## 2023-01-16 PROCEDURE — 160029 HCHG SURGERY MINUTES - 1ST 30 MINS LEVEL 4: Performed by: SPECIALIST

## 2023-01-16 PROCEDURE — 160002 HCHG RECOVERY MINUTES (STAT): Performed by: SPECIALIST

## 2023-01-16 PROCEDURE — A9270 NON-COVERED ITEM OR SERVICE: HCPCS | Performed by: SPECIALIST

## 2023-01-16 PROCEDURE — A9270 NON-COVERED ITEM OR SERVICE: HCPCS | Performed by: ANESTHESIOLOGY

## 2023-01-16 PROCEDURE — 85610 PROTHROMBIN TIME: CPT

## 2023-01-16 PROCEDURE — 10H07YZ INSERTION OF OTHER DEVICE INTO PRODUCTS OF CONCEPTION, VIA NATURAL OR ARTIFICIAL OPENING: ICD-10-PCS | Performed by: SPECIALIST

## 2023-01-16 PROCEDURE — 160048 HCHG OR STATISTICAL LEVEL 1-5: Performed by: SPECIALIST

## 2023-01-16 PROCEDURE — 85025 COMPLETE CBC W/AUTO DIFF WBC: CPT

## 2023-01-16 PROCEDURE — 770002 HCHG ROOM/CARE - OB PRIVATE (112)

## 2023-01-16 PROCEDURE — 85730 THROMBOPLASTIN TIME PARTIAL: CPT

## 2023-01-16 PROCEDURE — 160041 HCHG SURGERY MINUTES - EA ADDL 1 MIN LEVEL 4: Performed by: SPECIALIST

## 2023-01-16 PROCEDURE — 700101 HCHG RX REV CODE 250: Performed by: ANESTHESIOLOGY

## 2023-01-16 RX ORDER — MORPHINE SULFATE 4 MG/ML
INJECTION INTRAVENOUS
Status: DISCONTINUED
Start: 2023-01-16 | End: 2023-01-16

## 2023-01-16 RX ORDER — OXYCODONE HYDROCHLORIDE 5 MG/1
5 TABLET ORAL EVERY 4 HOURS PRN
Status: DISCONTINUED | OUTPATIENT
Start: 2023-01-17 | End: 2023-01-16

## 2023-01-16 RX ORDER — IBUPROFEN 800 MG/1
800 TABLET ORAL EVERY 8 HOURS
Status: DISCONTINUED | OUTPATIENT
Start: 2023-01-17 | End: 2023-01-17

## 2023-01-16 RX ORDER — CEFAZOLIN SODIUM 1 G/3ML
INJECTION, POWDER, FOR SOLUTION INTRAMUSCULAR; INTRAVENOUS PRN
Status: DISCONTINUED | OUTPATIENT
Start: 2023-01-16 | End: 2023-01-16 | Stop reason: SURG

## 2023-01-16 RX ORDER — SIMETHICONE 125 MG
125 TABLET,CHEWABLE ORAL 4 TIMES DAILY PRN
Status: DISCONTINUED | OUTPATIENT
Start: 2023-01-16 | End: 2023-01-20 | Stop reason: HOSPADM

## 2023-01-16 RX ORDER — OXYCODONE HYDROCHLORIDE 5 MG/1
5 TABLET ORAL EVERY 4 HOURS PRN
Status: DISCONTINUED | OUTPATIENT
Start: 2023-01-16 | End: 2023-01-20 | Stop reason: HOSPADM

## 2023-01-16 RX ORDER — CALCIUM CARBONATE 500 MG/1
1000 TABLET, CHEWABLE ORAL EVERY 6 HOURS PRN
Status: DISCONTINUED | OUTPATIENT
Start: 2023-01-16 | End: 2023-01-20 | Stop reason: HOSPADM

## 2023-01-16 RX ORDER — VITAMIN A ACETATE, BETA CAROTENE, ASCORBIC ACID, CHOLECALCIFEROL, .ALPHA.-TOCOPHEROL ACETATE, DL-, THIAMINE MONONITRATE, RIBOFLAVIN, NIACINAMIDE, PYRIDOXINE HYDROCHLORIDE, FOLIC ACID, CYANOCOBALAMIN, CALCIUM CARBONATE, FERROUS FUMARATE, ZINC OXIDE, CUPRIC OXIDE 3080; 12; 120; 400; 1; 1.84; 3; 20; 22; 920; 25; 200; 27; 10; 2 [IU]/1; UG/1; MG/1; [IU]/1; MG/1; MG/1; MG/1; MG/1; MG/1; [IU]/1; MG/1; MG/1; MG/1; MG/1; MG/1
1 TABLET, FILM COATED ORAL
Status: DISCONTINUED | OUTPATIENT
Start: 2023-01-16 | End: 2023-01-20 | Stop reason: HOSPADM

## 2023-01-16 RX ORDER — SODIUM CHLORIDE, SODIUM LACTATE, POTASSIUM CHLORIDE, AND CALCIUM CHLORIDE .6; .31; .03; .02 G/100ML; G/100ML; G/100ML; G/100ML
1000 INJECTION, SOLUTION INTRAVENOUS
Status: DISCONTINUED | OUTPATIENT
Start: 2023-01-16 | End: 2023-01-16 | Stop reason: HOSPADM

## 2023-01-16 RX ORDER — OXYCODONE HYDROCHLORIDE 10 MG/1
10 TABLET ORAL EVERY 4 HOURS PRN
Status: DISCONTINUED | OUTPATIENT
Start: 2023-01-17 | End: 2023-01-16

## 2023-01-16 RX ORDER — DIPHENHYDRAMINE HYDROCHLORIDE 50 MG/ML
25 INJECTION INTRAMUSCULAR; INTRAVENOUS EVERY 6 HOURS PRN
Status: DISCONTINUED | OUTPATIENT
Start: 2023-01-17 | End: 2023-01-20 | Stop reason: HOSPADM

## 2023-01-16 RX ORDER — ACETAMINOPHEN 500 MG
1000 TABLET ORAL EVERY 6 HOURS PRN
Status: DISCONTINUED | OUTPATIENT
Start: 2023-01-20 | End: 2023-01-17

## 2023-01-16 RX ORDER — SODIUM CHLORIDE, SODIUM LACTATE, POTASSIUM CHLORIDE, CALCIUM CHLORIDE 600; 310; 30; 20 MG/100ML; MG/100ML; MG/100ML; MG/100ML
INJECTION, SOLUTION INTRAVENOUS PRN
Status: DISCONTINUED | OUTPATIENT
Start: 2023-01-16 | End: 2023-01-20 | Stop reason: HOSPADM

## 2023-01-16 RX ORDER — HALOPERIDOL 5 MG/ML
1 INJECTION INTRAMUSCULAR
Status: DISCONTINUED | OUTPATIENT
Start: 2023-01-16 | End: 2023-01-16 | Stop reason: HOSPADM

## 2023-01-16 RX ORDER — ROPIVACAINE HYDROCHLORIDE 2 MG/ML
INJECTION, SOLUTION EPIDURAL; INFILTRATION; PERINEURAL CONTINUOUS
Status: DISCONTINUED | OUTPATIENT
Start: 2023-01-16 | End: 2023-01-20 | Stop reason: HOSPADM

## 2023-01-16 RX ORDER — DIPHENHYDRAMINE HYDROCHLORIDE 50 MG/ML
12.5 INJECTION INTRAMUSCULAR; INTRAVENOUS
Status: DISCONTINUED | OUTPATIENT
Start: 2023-01-16 | End: 2023-01-16 | Stop reason: HOSPADM

## 2023-01-16 RX ORDER — MORPHINE SULFATE 4 MG/ML
2 INJECTION INTRAVENOUS
Status: DISCONTINUED | OUTPATIENT
Start: 2023-01-16 | End: 2023-01-16 | Stop reason: HOSPADM

## 2023-01-16 RX ORDER — MORPHINE SULFATE 0.5 MG/ML
INJECTION, SOLUTION EPIDURAL; INTRATHECAL; INTRAVENOUS PRN
Status: DISCONTINUED | OUTPATIENT
Start: 2023-01-16 | End: 2023-01-16 | Stop reason: SURG

## 2023-01-16 RX ORDER — SODIUM CHLORIDE, SODIUM LACTATE, POTASSIUM CHLORIDE, AND CALCIUM CHLORIDE .6; .31; .03; .02 G/100ML; G/100ML; G/100ML; G/100ML
250 INJECTION, SOLUTION INTRAVENOUS PRN
Status: DISCONTINUED | OUTPATIENT
Start: 2023-01-16 | End: 2023-01-16 | Stop reason: HOSPADM

## 2023-01-16 RX ORDER — LIDOCAINE HYDROCHLORIDE 20 MG/ML
INJECTION, SOLUTION EPIDURAL; INFILTRATION; INTRACAUDAL; PERINEURAL PRN
Status: DISCONTINUED | OUTPATIENT
Start: 2023-01-16 | End: 2023-01-16 | Stop reason: SURG

## 2023-01-16 RX ORDER — ACETAMINOPHEN 500 MG
1000 TABLET ORAL EVERY 6 HOURS
Status: DISCONTINUED | OUTPATIENT
Start: 2023-01-17 | End: 2023-01-17

## 2023-01-16 RX ORDER — DOCUSATE SODIUM 100 MG/1
100 CAPSULE, LIQUID FILLED ORAL 2 TIMES DAILY PRN
Status: DISCONTINUED | OUTPATIENT
Start: 2023-01-16 | End: 2023-01-20 | Stop reason: HOSPADM

## 2023-01-16 RX ORDER — KETAMINE HCL 50MG/ML(1)
SYRINGE (ML) INTRAVENOUS PRN
Status: DISCONTINUED | OUTPATIENT
Start: 2023-01-16 | End: 2023-01-16 | Stop reason: SURG

## 2023-01-16 RX ORDER — IBUPROFEN 800 MG/1
800 TABLET ORAL EVERY 8 HOURS PRN
Status: DISCONTINUED | OUTPATIENT
Start: 2023-01-20 | End: 2023-01-17

## 2023-01-16 RX ORDER — OXYCODONE HYDROCHLORIDE 10 MG/1
10 TABLET ORAL EVERY 4 HOURS PRN
Status: DISCONTINUED | OUTPATIENT
Start: 2023-01-16 | End: 2023-01-20 | Stop reason: HOSPADM

## 2023-01-16 RX ORDER — MORPHINE SULFATE 4 MG/ML
1 INJECTION INTRAVENOUS
Status: DISCONTINUED | OUTPATIENT
Start: 2023-01-16 | End: 2023-01-16 | Stop reason: HOSPADM

## 2023-01-16 RX ORDER — ONDANSETRON 2 MG/ML
4 INJECTION INTRAMUSCULAR; INTRAVENOUS
Status: DISCONTINUED | OUTPATIENT
Start: 2023-01-16 | End: 2023-01-16 | Stop reason: HOSPADM

## 2023-01-16 RX ORDER — DIPHENHYDRAMINE HCL 25 MG
25 TABLET ORAL EVERY 6 HOURS PRN
Status: DISCONTINUED | OUTPATIENT
Start: 2023-01-17 | End: 2023-01-20 | Stop reason: HOSPADM

## 2023-01-16 RX ORDER — MIDAZOLAM HYDROCHLORIDE 1 MG/ML
INJECTION INTRAMUSCULAR; INTRAVENOUS PRN
Status: DISCONTINUED | OUTPATIENT
Start: 2023-01-16 | End: 2023-01-16 | Stop reason: SURG

## 2023-01-16 RX ORDER — ONDANSETRON 4 MG/1
4 TABLET, ORALLY DISINTEGRATING ORAL EVERY 6 HOURS PRN
Status: DISCONTINUED | OUTPATIENT
Start: 2023-01-17 | End: 2023-01-20 | Stop reason: HOSPADM

## 2023-01-16 RX ORDER — ONDANSETRON 2 MG/ML
4 INJECTION INTRAMUSCULAR; INTRAVENOUS EVERY 6 HOURS PRN
Status: DISCONTINUED | OUTPATIENT
Start: 2023-01-17 | End: 2023-01-20 | Stop reason: HOSPADM

## 2023-01-16 RX ADMIN — ROPIVACAINE HYDROCHLORIDE: 2 INJECTION, SOLUTION EPIDURAL; INFILTRATION at 05:28

## 2023-01-16 RX ADMIN — OXYTOCIN 125 ML/HR: 10 INJECTION, SOLUTION INTRAMUSCULAR; INTRAVENOUS at 14:07

## 2023-01-16 RX ADMIN — SODIUM CHLORIDE, POTASSIUM CHLORIDE, SODIUM LACTATE AND CALCIUM CHLORIDE: 600; 310; 30; 20 INJECTION, SOLUTION INTRAVENOUS at 04:25

## 2023-01-16 RX ADMIN — OXYTOCIN 125 ML/HR: 10 INJECTION, SOLUTION INTRAMUSCULAR; INTRAVENOUS at 15:16

## 2023-01-16 RX ADMIN — Medication 50 MG: at 11:48

## 2023-01-16 RX ADMIN — FENTANYL CITRATE 25 MCG: 50 INJECTION, SOLUTION INTRAMUSCULAR; INTRAVENOUS at 14:16

## 2023-01-16 RX ADMIN — CEFAZOLIN 1 G: 330 INJECTION, POWDER, FOR SOLUTION INTRAMUSCULAR; INTRAVENOUS at 11:45

## 2023-01-16 RX ADMIN — EPHEDRINE SULFATE 10 MG: 50 INJECTION INTRAMUSCULAR; INTRAVENOUS; SUBCUTANEOUS at 11:59

## 2023-01-16 RX ADMIN — FENTANYL CITRATE 25 MCG: 50 INJECTION, SOLUTION INTRAMUSCULAR; INTRAVENOUS at 14:12

## 2023-01-16 RX ADMIN — OXYTOCIN 1000 ML: 10 INJECTION, SOLUTION INTRAMUSCULAR; INTRAVENOUS at 11:50

## 2023-01-16 RX ADMIN — OXYTOCIN 500 ML: 10 INJECTION, SOLUTION INTRAMUSCULAR; INTRAVENOUS at 12:21

## 2023-01-16 RX ADMIN — MORPHINE SULFATE 5 MG: 0.5 INJECTION EPIDURAL; INTRATHECAL; INTRAVENOUS at 13:01

## 2023-01-16 RX ADMIN — LIDOCAINE HYDROCHLORIDE 7 ML: 20 INJECTION, SOLUTION EPIDURAL; INFILTRATION; INTRACAUDAL at 11:41

## 2023-01-16 RX ADMIN — LIDOCAINE HYDROCHLORIDE 4 ML: 20 INJECTION, SOLUTION EPIDURAL; INFILTRATION; INTRACAUDAL at 12:35

## 2023-01-16 RX ADMIN — FENTANYL CITRATE 25 MCG: 50 INJECTION, SOLUTION INTRAMUSCULAR; INTRAVENOUS at 14:10

## 2023-01-16 RX ADMIN — HYDROCODONE BITARTRATE AND ACETAMINOPHEN 15 MG: 7.5; 325 SOLUTION ORAL at 13:38

## 2023-01-16 RX ADMIN — OXYCODONE 5 MG: 5 TABLET ORAL at 16:53

## 2023-01-16 RX ADMIN — MIDAZOLAM HYDROCHLORIDE 2 MG: 1 INJECTION, SOLUTION INTRAMUSCULAR; INTRAVENOUS at 11:46

## 2023-01-16 RX ADMIN — FENTANYL CITRATE 25 MCG: 50 INJECTION, SOLUTION INTRAMUSCULAR; INTRAVENOUS at 13:56

## 2023-01-16 ASSESSMENT — PATIENT HEALTH QUESTIONNAIRE - PHQ9
SUM OF ALL RESPONSES TO PHQ9 QUESTIONS 1 AND 2: 0
2. FEELING DOWN, DEPRESSED, IRRITABLE, OR HOPELESS: NOT AT ALL
1. LITTLE INTEREST OR PLEASURE IN DOING THINGS: NOT AT ALL

## 2023-01-16 ASSESSMENT — PAIN DESCRIPTION - PAIN TYPE: TYPE: ACUTE PAIN

## 2023-01-16 NOTE — PROGRESS NOTES
The patient received a labor epidural yesterday evening.  She says her labor epidural is functioning well.  Her cervix was just checked by her nurse and found to be 4 cm dilated and 80% effaced and -3 station.  The fetal heart tracing is category 1.  She is having contractions with a frequency of about every 2 to 3 minutes.  We will continue electronic fetal monitoring and consider artificial rupture membranes when the station is lower and we will anticipate an obstetrical delivery.  Jefferson Cook MD

## 2023-01-16 NOTE — ANESTHESIA PREPROCEDURE EVALUATION
Date: 01/15/23  Procedure: Labor Epidural         Relevant Problems   No relevant active problems       Physical Exam    Airway   Mallampati: II  TM distance: >3 FB  Neck ROM: full       Cardiovascular - normal exam  Rhythm: regular  Rate: normal  (-) murmur     Dental - normal exam           Pulmonary - normal exam  Breath sounds clear to auscultation     Abdominal    Neurological - normal exam                 Anesthesia Plan    ASA 2       Plan - epidural   Neuraxial block will be labor analgesia                  Pertinent diagnostic labs and testing reviewed    Informed Consent:    Anesthetic plan and risks discussed with patient.

## 2023-01-16 NOTE — ANESTHESIA PROCEDURE NOTES
Epidural Block    Date/Time: 1/15/2023 7:15 PM  Performed by: Elton Dwyer D.O.  Authorized by: Elton Dwyer D.O.     Patient Location:  OB  Start Time:  1/15/2023 7:15 PM  End Time:  1/15/2023 7:27 PM  Reason for Block: labor analgesia    patient identified, IV checked, site marked, risks and benefits discussed, surgical consent, monitors and equipment checked, pre-op evaluation and timeout performed    Patient Position:  Sitting  Prep: ChloraPrep, patient draped and sterile technique    Monitoring:  Blood pressure, continuous pulse oximetry and heart rate  Approach:  Midline  Location:  L3-L4  Injection Technique:  KIRA air and KIRA saline  Skin infiltration:  Lidocaine  Strength:  1%  Dose:  3ml  Needle Type:  Tuohgena  Needle Gauge:  17 G  Needle Length:  3.5 in  Loss of resistance::  5.5  Catheter Size:  19 G  Catheter at Skin Depth:  12  Test Dose Result:  Negative

## 2023-01-16 NOTE — PROGRESS NOTES
"0700 report received from RAUL Aleman MD at bedside  SVE 6/90/-1  AROM, clear, small amount  IUPC placed    1128 pt called out with complaints of shortness of breath saying \"I can't breath\"   MD called to bedside to evaluate  FHT down into 50s  Assistance called to bedside  Emergency c/s called    1139 in OR    1146 c/s delivery of viable female infant, 8/9 apgars    1245 xray at bedside for count confirmation    1315 pt transferred via gurney to pacu bed 3     1530 pt transferred via gurney to s229    1900 report to Mariajose TERRY        " no

## 2023-01-16 NOTE — ANESTHESIA TIME REPORT
Anesthesia Start and Stop Event Times     Date Time Event    1/15/2023 1902 Ready for Procedure     1902 Anesthesia Start    1/16/2023 1148 Anesthesia Stop        Responsible Staff  01/15/23 to 01/16/23    Name Role Begin End    Elton Dwyer D.O. Anesth 1902 0706    Good Hayes M.D. Anesth 0706 1148        Overtime Reason:  no overtime (within assigned shift)    Comments:

## 2023-01-16 NOTE — OR SURGEON
Immediate Post OP Note    PreOp Diagnosis:   1.)  Intrauterine pregnancy at 38 weeks and 0 days gestation.  2.)  Intrauterine growth restriction.  3.)  Active labor after the patient was admitted for induction of labor for intrauterine growth restriction.  4.)  Acute severe maternal hypoxia.  5.)  Sudden deep prolonged fetal bradycardia    PostOp Diagnosis:   1.)  Intrauterine pregnancy at 38 weeks and 0 days gestation.  2.)  Intrauterine growth restriction.  3.)  Active labor after the patient was admitted for induction of labor for intrauterine growth restriction.  4.)  Acute severe maternal hypoxia.  5.)  Sudden deep prolonged fetal bradycardia  6.)  Live term female  with Apgar scores of 8 and 9 at 1 and 5 minutes respectively and a  weight of 2,350 grams.    Procedure(s):   SECTION, PRIMARY (emergency  section).    Surgeon(s):  Jefferson Cook M.D.    Assistant:  Jose L Burroughs M.D.     Anesthesiologist/Type of Anesthesia:  No anesthesia staff entered./Continuous epidural anesthesia    Surgical Staff:  * No surgical staff found *    Specimens removed if any:  None.    Estimated Blood Loss:   Approximately 700 cc.    Findings:   1.)  Live term female  with Apgar scores of 8 and 9 at 1 and 5 minutes respectively and  weight of 2,350 grams.  2.)  Normal uterus and normal fallopian tubes bilaterally and normal ovaries bilaterally.    Complications:   None.        2023 1:50 PM Jefferson Cook M.D.

## 2023-01-17 LAB
ERYTHROCYTE [DISTWIDTH] IN BLOOD BY AUTOMATED COUNT: 44 FL (ref 35.9–50)
HCT VFR BLD AUTO: 24.7 % (ref 37–47)
HGB BLD-MCNC: 8.4 G/DL (ref 12–16)
MCH RBC QN AUTO: 30.9 PG (ref 27–33)
MCHC RBC AUTO-ENTMCNC: 34 G/DL (ref 33.6–35)
MCV RBC AUTO: 90.8 FL (ref 81.4–97.8)
PLATELET # BLD AUTO: 126 K/UL (ref 164–446)
PMV BLD AUTO: 11.2 FL (ref 9–12.9)
RBC # BLD AUTO: 2.72 M/UL (ref 4.2–5.4)
WBC # BLD AUTO: 13.4 K/UL (ref 4.8–10.8)

## 2023-01-17 PROCEDURE — 700111 HCHG RX REV CODE 636 W/ 250 OVERRIDE (IP): Performed by: SPECIALIST

## 2023-01-17 PROCEDURE — 700111 HCHG RX REV CODE 636 W/ 250 OVERRIDE (IP)

## 2023-01-17 PROCEDURE — A9270 NON-COVERED ITEM OR SERVICE: HCPCS | Performed by: SPECIALIST

## 2023-01-17 PROCEDURE — 700102 HCHG RX REV CODE 250 W/ 637 OVERRIDE(OP): Performed by: SPECIALIST

## 2023-01-17 PROCEDURE — 36415 COLL VENOUS BLD VENIPUNCTURE: CPT

## 2023-01-17 PROCEDURE — 85027 COMPLETE CBC AUTOMATED: CPT

## 2023-01-17 PROCEDURE — 770002 HCHG ROOM/CARE - OB PRIVATE (112)

## 2023-01-17 RX ORDER — ACETAMINOPHEN 500 MG
1000 TABLET ORAL EVERY 6 HOURS PRN
Status: DISCONTINUED | OUTPATIENT
Start: 2023-01-17 | End: 2023-01-20 | Stop reason: HOSPADM

## 2023-01-17 RX ORDER — IBUPROFEN 800 MG/1
800 TABLET ORAL EVERY 6 HOURS PRN
Status: DISCONTINUED | OUTPATIENT
Start: 2023-01-17 | End: 2023-01-20 | Stop reason: HOSPADM

## 2023-01-17 RX ADMIN — ACETAMINOPHEN 1000 MG: 500 TABLET ORAL at 12:43

## 2023-01-17 RX ADMIN — OXYCODONE 5 MG: 5 TABLET ORAL at 05:25

## 2023-01-17 RX ADMIN — ACETAMINOPHEN 1000 MG: 500 TABLET ORAL at 22:22

## 2023-01-17 RX ADMIN — FENTANYL CITRATE 50 MCG: 50 INJECTION, SOLUTION INTRAMUSCULAR; INTRAVENOUS at 08:09

## 2023-01-17 RX ADMIN — OXYCODONE 5 MG: 5 TABLET ORAL at 22:22

## 2023-01-17 RX ADMIN — IBUPROFEN 800 MG: 800 TABLET, FILM COATED ORAL at 16:40

## 2023-01-17 RX ADMIN — FENTANYL CITRATE 50 MCG: 50 INJECTION, SOLUTION INTRAMUSCULAR; INTRAVENOUS at 12:37

## 2023-01-17 RX ADMIN — PRENATAL WITH FERROUS FUM AND FOLIC ACID 1 TABLET: 3080; 920; 120; 400; 22; 1.84; 3; 20; 10; 1; 12; 200; 27; 25; 2 TABLET ORAL at 08:09

## 2023-01-17 RX ADMIN — OXYCODONE HYDROCHLORIDE 10 MG: 10 TABLET ORAL at 00:20

## 2023-01-17 ASSESSMENT — PAIN DESCRIPTION - PAIN TYPE: TYPE: SURGICAL PAIN

## 2023-01-17 NOTE — CARE PLAN
The patient is Watcher - Medium risk of patient condition declining or worsening        Problem: Knowledge Deficit - L&D  Goal: Patient and family/caregivers will demonstrate understanding of plan of care, disease process/condition, diagnostic tests and medications  Outcome: Progressing     Problem: Risk for Excess Fluid Volume  Goal: Patient will demonstrate pulse, blood pressure and neurologic signs within expected ranges and without any respiratory complications  Outcome: Progressing     Problem: Pain  Goal: Patient's pain will be alleviated or reduced to the patient’s comfort goal  Outcome: Progressing     Problem: Risk for Injury  Goal: Patient and fetus will be free of preventable injury/complications  1/17/2023 0932 by Abraham Love R.N.  Outcome: Progressing  1/17/2023 0931 by Abraham Love R.N.  Outcome: Progressing

## 2023-01-17 NOTE — PROGRESS NOTES
(Late entry. I saw the patient this morning at about 7 o'clock in the morning.)  The patient is today postoperative day number one status post primary low transverse  section (emergency  section).  She says she is experiencing lower abdominal pain and soreness and cramping type pain. She says that her vaginal bleeding is minimal. Her Marquis catheter is still in place. She has been tolerating oral fluids. She has not yet ambulated.  Vital signs: the patient's vital signs are stable and she has been afebrile. This morning her temperature was 36.1 degrees Celsius (is 96.9 degrees Fahrenheit) and her heart rate this morning was 82 beats per minute and her respiratory rate was 18 breath per minute enter pulse oximetry this morning was 96 percent on room air and her blood pressure this morning was 114/67.  General: the patient appears well developed and well-nourished and in no apparent distress.  Labs: the patient's hemoglobin decreased from 12.2 grams per deciliter preoperatively/antepartum to 11.3 grams per deciliter intraoperative 28.4 grams per deciliter postoperatively (this morning). Her platelet count this morning was 126,000. Intraoperatively yesterday at about 1230 (12:33 time yesterday) disease are platelet count was 124,000. Her white blood count this morning was 13.4.  Assessment:  The patient is today postoperative day number one status post primary low transverse  section (emergency  section). She appears to be recovering appropriately.  Postoperative anemia. It is not appear that the patient experienced postpartum hemorrhage.  Mild thrombocytopenia which appears to be stable.  Plan:  We will plan on removing her Marquis catheter later today and having the patient ambulate later today and advance diet as tolerated and continue to give analgesia as needed and repeat labs.  Jefferson Cook M.D.

## 2023-01-17 NOTE — PROGRESS NOTES
0700 - Report received from Mariajose TERRY, care assumed; patient is awake.   NB is in the NICU  FOB is at home caring for their other children.      Reports reports little sleep last night. Denies ill feeling, reports discomfort of incision site - reports pain medication makes her sleepy and does not help with the pain. Patient discussed options with Dr. Cook - order received for fentanyl every 4 hours as needed today and encouraged to allow patient to rest today.     Discussed alternate pain management options. Denies change to vision/edema/HA. Marquis draining to gravity.   States understanding of POC/expectations. States understanding regarding care of herself.    Patient encouraged to ask questions/state needs.  Patient encouraged to call RN with all questions/concerns/needs PRN. RN/CNA contact information updated on the dry erase board, reviewed.     1745 - Discussed POC for pain management - stopping the IV fentanyl and continuing to alternate ibuprofen and tylenol, and the narcotic if necessary/helpful. Marquis catheter removed.

## 2023-01-17 NOTE — DISCHARGE PLANNING
Discharge Planning Assessment Post Partum     Reason for Referral: NICU  Address: [address] [place]  Type of Living Situation:Stable   Mom Diagnosis: Postpartum   Baby Diagnosis: NICU 38  Primary Language: English      Name of Baby: Phong Berman  Father of the Baby: Marquise Berman  Involved in baby’s care? Yes  Contact Information: [phone]     Prenatal Care: Yes  Mom's PCP: None  PCP for new baby:List given      Support System: Yes  Coping/Bonding between mother & baby: MOB coping/bonding when stable   Source of Feeding: Breast  Supplies for Infant: Yes     Mom's Insurance: Medicaid   Baby Covered on Insurance:yes  Mother Employed/School: None  Other children in the home/names & ages: 8, 5, 3, and 2     Financial Hardship/Income: None   Mom's Mental status: Stable and alert   Services used prior to admit: None     CPS History: None  Psychiatric History: None  Domestic Violence History: None  Drug/ETOH History: None     Resources Provided:  provided pediatrician list and community resources   Referrals Made: None      Clearance for Discharge: Baby is cleared to discharge with MOB and FOB when medically cleared       Ongoing Plan: will continue to provide support resources during NICU admission

## 2023-01-17 NOTE — ANESTHESIA POSTPROCEDURE EVALUATION
Patient: Wade Bergeron    Procedure Summary     Date: 01/15/23 Room / Location:     Anesthesia Start: 1902 Anesthesia Stop: 01/16/23 1148    Procedure: Labor Epidural Diagnosis:     Scheduled Providers:  Responsible Provider: Good Hayes M.D.    Anesthesia Type: epidural ASA Status: 2          Final Anesthesia Type: epidural  Last vitals  BP   Blood Pressure: 115/71    Temp   (!) 35.8 °C (96.4 °F)    Pulse   92   Resp   17    SpO2   97 %      Anesthesia Post Evaluation    Patient location during evaluation: PACU  Patient participation: complete - patient participated  Level of consciousness: awake and alert    Airway patency: patent  Anesthetic complications: no  Cardiovascular status: hemodynamically stable  Respiratory status: acceptable  Hydration status: euvolemic    PONV: none          There were no known notable events for this encounter.     Nurse Pain Score: 8 (NPRS)

## 2023-01-17 NOTE — OP REPORT
DATE OF SERVICE:  2023     PREOPERATIVE DIAGNOSES:  1.  Intrauterine pregnancy at 38 weeks and 0 days gestation.  2.  Intrauterine growth restriction.  3.  Active labor after the patient was admitted for induction of labor for   intrauterine growth restriction.  4.  Acute severe maternal hypoxia.  5.  Sudden deep prolonged fetal bradycardia, which did not resolved.     POSTOPERATIVE DIAGNOSES:  1.  Intrauterine pregnancy at 38 weeks and 0 days gestation.  2.  Intrauterine growth restriction.  3.  Active labor after the patient was admitted for induction of labor for   intrauterine growth restriction.  4.  Acute severe maternal hypoxia.  5.  Sudden deep prolonged fetal bradycardia, which did not resolved.  6.  Live term female  with Apgar scores of 8 and 9 at 1 and 5 minutes   respectively and a  weight of 2,350 grams.     PROCEDURE:  Primary low transverse  section (emergency    section).     SURGEON:  Jefferson Cook MD     ASSISTANT:  Abdirahman Burroughs MD     ANESTHESIA:  Continuous epidural anesthesia.     ANESTHESIOLOGIST:  Good Hayes MD     FINDINGS:  1.  Live term female  with Apgar scores of 8 and 9 at 1 and 5 minutes   respectively and a  weight of 2,350 grams.  2.  Normal uterus, normal fallopian tubes bilaterally, normal ovaries   bilaterally.     SPECIMENS:  None.     COMPLICATIONS:  None.     ESTIMATED BLOOD LOSS:  Approximately 700 mL.     DESCRIPTION OF PROCEDURE:  After the appropriate consents have been obtained,   the patient was taken to the operating room and given a bolus of local   anesthetic through her epidural catheter and also IV sedation.  She was   immediately brought to the operating room and prepped and draped in the dorsal   supine position and a Marquis catheter was noted to be in place and draining   urine.  A Pfannenstiel incision was made in the lower abdomen using a scalpel   and this incision was made few fingerbreadths superior to  the pubic symphysis.    This incision was continued deeply through subcutaneous tissues using a   scalpel.  The anterior rectus fascia was identified and incised transversely   with a scalpel and this incision was extended bilaterally with scissors.    Superior aspect of the fascial incision was undermined from the underlying   rectus muscle with both scissors and blunt dissection.  The lower aspect of   the fascial incision was similarly undermined from the underlying rectus   muscle with blunt dissection and scissors.  The midline of the rectus muscle   was identified to the diastasis in the midline and this was developed with   blunt dissection continued superiorly and inferiorly with scissors.  Blunt   dissection through the preperitoneal adipose tissues allows identification of   the parietal peritoneum, which was entered bluntly.  The entry in the   peritoneum was extended with blunt dissection.  Retractors were introduced to   expose the anterior lower uterine segment and the serosa overlying the segment   was grasped and incised with scissors and this incision was extended   bilaterally with scissors and the bladder was dissected away bluntly.  The   anterior lower uterine segment was incised transversely with a scalpel.  The   hysterotomy was extended bilaterally with blunt dissection.  A hand was placed   in the intrauterine cavity around baby's head and fundal pressure was used to   easily deliver baby's head and baby's body.  Baby's nasopharynx was suctioned   with bulb syringe.  After 30 seconds of delayed cord clamping was observed,   the umbilical cord was doubly clamped and cut and baby was handed to the   awaiting nurse.  A segment of umbilical cord was set aside for possible cord   gases, which are not obtained because baby's Apgar scores were 8 and 9 at 1   and 5 minutes respectively.  The placenta was manually removed.  The uterus   was exteriorized and the interior of the uterus was wiped clean  of products of   conception.  The hysterotomy is reapproximated with a continuous interlocking   suture of #1 chromic in a few interrupted mattress and figure-of-eight   sutures of #1 chromic were placed and certain sites along the hysterotomy   repair and to achieve hemostasis.  Some oozing from various areas were noted   and because there had been some suspicion of amniotic fluid embolus (although   the suspicion was less as the patient's vital signs during this procedure were   actually stable and her oxygen saturations were well maintained).  The   suspicion remains nonetheless of the possibility of amniotic fluid embolism   and so a stat coags were ordered.  The Bovie electrocautery had not been set   up at this time and so the Bovie electrocautery was set up and some small   areas of bleeding were cauterized.  Multiple areas of oozing were noted and   fibrin thrombin sheaths were placed on various areas of the hysterotomy repair   and pressure was placed on this area for about 5 minutes.  Pressure was   removed and hemostasis appeared to be adequate.  Retractors were removed.  Lap   and needle counts reported to be correct at this time.  The parietal   peritoneum was identified and reapproximated with simple continuous suture   using 2-0 Vicryl.  The rectus muscle was examined superiorly and inferiorly   and the underside of both superior aspect of the fascia and the inferior   aspect of the fascia were examined and hemostasis appeared to be adequate.    The fascia was closed with a continuous simple suture using #1 chromic.  The   wound was copiously irrigated and drained and hemostasis noted to be   excellent.  Subcutaneous tissues were reapproximated with simple continuous   suture using 3-0 Vicryl.  Finally, the skin was reapproximated with placement   of many interrupted buried sutures of 4-0 Monocryl placed in the dermis and at   least one set sutures placed for every 1 cm of length along the entire  length   of the skin incision.  The skin incision was thus reapproximated nicely in   this way.  A pressure dressing is placed and set up.     An x-ray of the abdomen is taken because lap counts could not be performed   prior to the start of the surgery because this was an emergency    section.  X-rays were reportedly revealed no evidence of retained instruments   or lap sponges.     After the procedure was terminated, labs became available.  The patient's INR   was 1.28, which is slightly elevated and her PT was slightly elevated at 15.8   seconds (reference range 12.0 seconds to 14.6 seconds) and her PTT was normal   at 34.0 seconds (reference range 24.7 seconds to 36.0 seconds).  Her   fibrinogen was normal at 243 mg/dL (reference range is 215 mg/dL to 460   mg/dL).  Her platelet count was somewhat decreased at 124,000 indicating   slight thrombocytopenia.        ______________________________  Jefferson Cook MD    MED/ASS/AFTARA    DD:  2023 14:21  DT:  2023 15:21    Job#:  [id number]    CC:MD Good Garcia MD Roxanna M. Twedt, MD

## 2023-01-17 NOTE — PROGRESS NOTES
DATE OF SERVICE:  01/16/2023     PREOPERATIVE NOTE     The patient is a very pleasant 28-year-old multipara (on admission, para 4   with 4 previous vaginal deliveries) who was admitted to [facility] 2 days ago, namely on Saturday, 1/14/2023   and she was admitted at 37 weeks and 5 days' gestation, and was admitted for   induction of labor because of intrauterine growth restriction.  The patient   had her prenatal care with myself during the course of her pregnancy and also   seen during the course of her pregnancy by perinatology, namely [facility].  The diagnosis of intrauterine growth restriction was made   recently by perinatology ([facility]) and it was recommended   by perinatologist, Dr. Violet Feliz, in her report from the ultrasound   performed on 12/30/2022 that the estimated fetal weight was in the eighth   percentile and abdominal circumference was in the fourth percentile and that   umbilical artery Doppler studies revealed an abnormally elevated SD ratio of   4.03 and in her report Dr. Feliz stated that she recommended delivery at 37 to   37-1/2 weeks gestation because of intrauterine growth restriction and so, the   patient was admitted to [facility] on   Saturday, 1/14/2023.  On admission, her cervix was found to be essentially   long and closed and high.  It was noted that her recent vaginal culture for   group B strep had come back negative for group B strep.  The fetal heart   tracing was category 1.  She received a dose of Cytotec 25 mcg per vagina.    The next morning, Sunday, 1/15/2023 it was noted that her contractions have   become less frequent.  After she received the first dose of Cytotec Saturday   morning, she subsequently went on to have fairly frequent contractions.  So,   the next day, Sunday, 1/15/2023, I checked her cervix and at that time found   her cervix to about 2  cm dilated and 60% effaced and -2 station and posterior   and I did at that time, namely  morning, place another dose of Cytotec   25 mcg per vagina.  Later that day, in the evening, she received a labor   epidural and her labor epidural functioned well.  Her cervix changed.  This   morning at about 6:48, her labor epidural was found to be functioning well and   I checked her cervix and found her cervix to be about 4 cm dilated and 80%   effaced and -3 station and the fetal heart tracing continued to be category 1   and she was continuing to have contractions with a frequency of about once   every 2-3 minutes.  Then, later in the morning, I rechecked her cervix and   then found her cervix to be 6 cm dilated, 90% effaced, the station was -1   station.  I did at that time performed artificial rupture of membranes at   about 11:20 a.m. and clear amniotic fluid was observed and I placed an   intrauterine pressure catheter at that time.  The patient was doing well at   that time, but several minutes later sudden severe maternal hypoxia (acute   maternal hypoxia) was noted.  The patient's oxygen saturation dropping to 70%   and this was accompanied by a sudden (acute) severe deep prolonged fetal   bradycardia, which did not resolve.  The possibility of amniotic fluid   embolism was suspected.  I did check her cervix and her cervix was still 6 cm   dilated.  So, the patient was immediately transferred to the operating room   where an emergency  section was performed and she was delivered of a   live term female  with Apgar scores of 8 and 9 at 1 and 5 minutes   respectively and a  weight of 2350 grams.  Please see the written and   dictated postoperative note for details.        ______________________________  MD CONRAD Peña/SANDRA/MITCH    DD:  2023 14:12  DT:  2023 15:22    Job#:  [id number]

## 2023-01-18 PROCEDURE — 770002 HCHG ROOM/CARE - OB PRIVATE (112)

## 2023-01-18 PROCEDURE — A9270 NON-COVERED ITEM OR SERVICE: HCPCS | Performed by: SPECIALIST

## 2023-01-18 PROCEDURE — 700102 HCHG RX REV CODE 250 W/ 637 OVERRIDE(OP): Performed by: SPECIALIST

## 2023-01-18 RX ADMIN — IBUPROFEN 800 MG: 800 TABLET, FILM COATED ORAL at 17:29

## 2023-01-18 RX ADMIN — IBUPROFEN 800 MG: 800 TABLET, FILM COATED ORAL at 22:57

## 2023-01-18 RX ADMIN — IBUPROFEN 800 MG: 800 TABLET, FILM COATED ORAL at 07:34

## 2023-01-18 RX ADMIN — PRENATAL WITH FERROUS FUM AND FOLIC ACID 1 TABLET: 3080; 920; 120; 400; 22; 1.84; 3; 20; 10; 1; 12; 200; 27; 25; 2 TABLET ORAL at 07:35

## 2023-01-18 RX ADMIN — SIMETHICONE 125 MG: 125 TABLET, CHEWABLE ORAL at 15:50

## 2023-01-18 RX ADMIN — DOCUSATE SODIUM 100 MG: 100 CAPSULE, LIQUID FILLED ORAL at 15:49

## 2023-01-18 RX ADMIN — OXYCODONE 5 MG: 5 TABLET ORAL at 07:34

## 2023-01-18 RX ADMIN — ACETAMINOPHEN 1000 MG: 500 TABLET ORAL at 18:36

## 2023-01-18 ASSESSMENT — PAIN DESCRIPTION - PAIN TYPE
TYPE: ACUTE PAIN
TYPE: SURGICAL PAIN
TYPE: ACUTE PAIN;SURGICAL PAIN
TYPE: ACUTE PAIN
TYPE: ACUTE PAIN;SURGICAL PAIN
TYPE: ACUTE PAIN

## 2023-01-18 ASSESSMENT — EDINBURGH POSTNATAL DEPRESSION SCALE (EPDS)
I HAVE LOOKED FORWARD WITH ENJOYMENT TO THINGS: AS MUCH AS I EVER DID
THINGS HAVE BEEN GETTING ON TOP OF ME: NO, I HAVE BEEN COPING AS WELL AS EVER
I HAVE FELT SCARED OR PANICKY FOR NO GOOD REASON: NO, NOT AT ALL
I HAVE BEEN SO UNHAPPY THAT I HAVE HAD DIFFICULTY SLEEPING: NOT AT ALL
I HAVE BLAMED MYSELF UNNECESSARILY WHEN THINGS WENT WRONG: NOT VERY OFTEN
THE THOUGHT OF HARMING MYSELF HAS OCCURRED TO ME: NEVER
I HAVE FELT SAD OR MISERABLE: NO, NOT AT ALL
I HAVE BEEN SO UNHAPPY THAT I HAVE BEEN CRYING: NO, NEVER
I HAVE BEEN ABLE TO LAUGH AND SEE THE FUNNY SIDE OF THINGS: AS MUCH AS I ALWAYS COULD
I HAVE BEEN ANXIOUS OR WORRIED FOR NO GOOD REASON: YES, VERY OFTEN

## 2023-01-18 NOTE — PROGRESS NOTES
Patient arrived to room S 334 via wheelchair. VSS. Pain minimal after PRN oxy given PTA. C/S dressing CDI. Pumping supplies at bedside, verbalized understanding of instructions for use -  will call for assistance as needed.

## 2023-01-18 NOTE — CARE PLAN
The patient is Stable - Low risk of patient condition declining or worsening    Shift Goals  Clinical Goals: Clinically stable  Patient Goals: Feel better  Family Goals: None present    Progress made toward(s) clinical / shift goals:  VSS, pt has been out of bed to void. Pain well controlled at this time    Patient is not progressing towards the following goals:

## 2023-01-18 NOTE — LACTATION NOTE
Met with MOB to assist her with pumping, if needed, and to answer lactation questions and/or concerns at this time.  MOB stated her feeding goal for infant is to breastfeed, but stated she does not have a pump.  A hospital grade breast pump was observed at bedside, but without pump parts, and this LC offered to assist MOB with initiating pumping now, but MOB stated she prefers to start pumping once she is on the postpartum unit.  MOB was observed talking on her cell phone when this LC entered the room.    MOB with no lactation questions and/or concerns at this time.

## 2023-01-18 NOTE — PROGRESS NOTES
The patient is a operative day #2 status post primary low transverse  section (emergency  section).  She tells me this morning that her Marquis catheter was removed and that subsequently she has been urinating and ambulating and tolerating oral fluids and says that her vaginal bleeding is minimal.  Vital signs: The patient's vital signs are stable and she is afebrile.  General: The patient appears well-developed and well-nourished and relaxed and alert and comfortable and in no apparent distress.  Assessment:  Postoperative day #2 status post primary  section (emergency  section).  Postoperative anemia.  This appears to be asymptomatic.  Plan:  We will continue ambulation and continue analgesia as needed and advance diet as tolerated and transferred to postpartum.  Jefferson Cook MD

## 2023-01-18 NOTE — PROGRESS NOTES
0700- Report received from HARRISON Yo. Dr Cook has cleared pt to go up to post partum.   0734- HARRISON Madrigal at bedside and medicated pt for pain 8/10 in abdomen. Pt made aware that we will be transferring her to post partum this am after her pain is controled and she finishes breakfast. Pt agrees with plan.   0940- Pt assisted up to BR and voided successfully. Pads and underwear changed. Fundus Firm at 1 below U with light-scant lochia rubra. Pressure dressing in place over abdominal incision c/d/I. Pt taken to NICU via wheelchair to see baby.  1010- Pt taken to post partum unit and report given to HARRISON Cueva.

## 2023-01-19 PROCEDURE — 770002 HCHG ROOM/CARE - OB PRIVATE (112)

## 2023-01-19 PROCEDURE — A9270 NON-COVERED ITEM OR SERVICE: HCPCS | Performed by: SPECIALIST

## 2023-01-19 PROCEDURE — 700102 HCHG RX REV CODE 250 W/ 637 OVERRIDE(OP): Performed by: SPECIALIST

## 2023-01-19 RX ADMIN — ACETAMINOPHEN 1000 MG: 500 TABLET ORAL at 00:37

## 2023-01-19 RX ADMIN — ACETAMINOPHEN 1000 MG: 500 TABLET ORAL at 15:21

## 2023-01-19 RX ADMIN — ACETAMINOPHEN 1000 MG: 500 TABLET ORAL at 09:09

## 2023-01-19 RX ADMIN — ACETAMINOPHEN 1000 MG: 500 TABLET ORAL at 21:27

## 2023-01-19 RX ADMIN — IBUPROFEN 800 MG: 800 TABLET, FILM COATED ORAL at 12:30

## 2023-01-19 RX ADMIN — PRENATAL WITH FERROUS FUM AND FOLIC ACID 1 TABLET: 3080; 920; 120; 400; 22; 1.84; 3; 20; 10; 1; 12; 200; 27; 25; 2 TABLET ORAL at 12:30

## 2023-01-19 ASSESSMENT — PAIN DESCRIPTION - PAIN TYPE
TYPE: ACUTE PAIN

## 2023-01-19 NOTE — PROGRESS NOTES
Assumed care of patient, report at bedside from, Margi TERRY. Assessment completed and WDL. Call light within reach, discussed plan of care, denies pain at the moment. Will continue to monitor.

## 2023-01-19 NOTE — CARE PLAN
The patient is Stable - Low risk of patient condition declining or worsening    Shift Goals  Clinical Goals: stable vitals, initiate pumping  Patient Goals: bonding  Family Goals: GABRIEL    Progress made toward(s) clinical / shift goals:  VSS. Patient passing gas, but difficulty passing BM. Patient encouraged to increase water intake. Administered colace and simethicone, offered heat packs to aid with cramping. Encouraged ambulation. Patient observing  in NICU via phone, expressed optimism at seeing baby and regarding baby's care and condition.     Patient is not progressing towards the following goals: NA      Problem: Psychosocial - Postpartum  Goal: Patient will verbalize and demonstrate effective bonding and parenting behavior  Outcome: Progressing     Problem: Altered Physiologic Condition  Goal: Patient physiologically stable as evidenced by normal lochia, palpable uterine involution and vitals within normal limits  Outcome: Progressing     Problem: Bowel Elimination - Post Surgical  Goal: Patient will resume regular bowel sounds and function with no discomfort or distention  Outcome: Progressing

## 2023-01-19 NOTE — CARE PLAN
The patient is Stable - Low risk of patient condition declining or worsening    Shift Goals  Clinical Goals: maintain VSS  Patient Goals: bonding  Family Goals: GABRIEL    Progress made toward(s) clinical / shift goals:  Patient advised to ambulate, stated she was building up gas. Lactation assisted with pump, patient receptive and was able to pump 3 oz. Visited infant in nicu.     Patient is not progressing towards the following goals:

## 2023-01-19 NOTE — PROGRESS NOTES
The patient is today postoperative day #3 status post primary  section.  She says she does experience some abdominal soreness.  She says she also is experiencing what she describes as abdominal gas pain.  She says she thinks she was able to start passing some flatus yesterday but that today she does not seem to be able to pass flatus.  She says she is tolerating oral intake.  Vital signs: The patient's vital signs are stable and she is afebrile.The patient's temperature this morning was 36.8 °C (98.2 °F) and her heart rate this morning was 71 bpm and her respiratory rate this morning was 16 breaths/min and her pulse oximetry is 99% on room air and her blood pressure is 131/67.  General: The patient appears well-developed and well-nourished and relaxed and alert and comfortable and in no apparent distress.  Assessment:  The patient is today postoperative day #3 status post primary low transverse  section (emergency  section).  Postoperative anemia.  This appears to be asymptomatic.  Possible postoperative ileus.  Plan:  Explained to the patient that I would like for her to walk more today and that ambulation along with oral Mylicon should help relieve her problems with what she describes as abdominal gas pain.  I explained to her that when she is starting to pass more flatus that I expect that this pain will resolve.  We will continue to give analgesia as needed and anticipate discharge home tomorrow.  Jefferson Cook MD

## 2023-01-19 NOTE — LACTATION NOTE
This note was copied from a baby's chart.  Bedside RN inquired about pump set up. LC answered questions and instructed  RN on settings. Encouraged to call lactation team for any concerns.

## 2023-01-19 NOTE — LACTATION NOTE
This note was copied from a baby's chart.  Basics of hospital grade breast pump use introduced today with mother. Written information to help support frequency and duration provided. Plan is to follow this mother while baby remains hospitalized to ensure the establishment and subsequent maintenance of adequate milk supply, and help direct her to appropriate resources.     Clint video recommended, massage techniques practiced and hygiene reviewed. Hygiene bucket with soap and supplies provided to bedside with explanation.    Mother instructed to request milk labels at next visit to NICU.

## 2023-01-20 ENCOUNTER — PHARMACY VISIT (OUTPATIENT)
Dept: PHARMACY | Facility: MEDICAL CENTER | Age: 28
End: 2023-01-20
Payer: MEDICARE

## 2023-01-20 VITALS
BODY MASS INDEX: 23.92 KG/M2 | OXYGEN SATURATION: 100 % | WEIGHT: 130 LBS | HEIGHT: 62 IN | DIASTOLIC BLOOD PRESSURE: 66 MMHG | TEMPERATURE: 98.9 F | HEART RATE: 62 BPM | SYSTOLIC BLOOD PRESSURE: 111 MMHG | RESPIRATION RATE: 18 BRPM

## 2023-01-20 PROCEDURE — 700102 HCHG RX REV CODE 250 W/ 637 OVERRIDE(OP): Performed by: SPECIALIST

## 2023-01-20 PROCEDURE — A9270 NON-COVERED ITEM OR SERVICE: HCPCS | Performed by: SPECIALIST

## 2023-01-20 PROCEDURE — RXMED WILLOW AMBULATORY MEDICATION CHARGE: Performed by: SPECIALIST

## 2023-01-20 RX ORDER — FERROUS SULFATE 325(65) MG
325 TABLET ORAL DAILY
Qty: 30 TABLET | Refills: 0 | Status: SHIPPED | OUTPATIENT
Start: 2023-01-20

## 2023-01-20 RX ORDER — DOCUSATE SODIUM 100 MG/1
100 CAPSULE, LIQUID FILLED ORAL 2 TIMES DAILY
Qty: 60 CAPSULE | Refills: 0 | Status: SHIPPED | OUTPATIENT
Start: 2023-01-20

## 2023-01-20 RX ORDER — OXYCODONE HYDROCHLORIDE AND ACETAMINOPHEN 5; 325 MG/1; MG/1
1 TABLET ORAL EVERY 6 HOURS PRN
Qty: 28 TABLET | Refills: 0 | Status: SHIPPED | OUTPATIENT
Start: 2023-01-20 | End: 2023-01-27

## 2023-01-20 RX ORDER — IBUPROFEN 800 MG/1
800 TABLET ORAL EVERY 8 HOURS PRN
Qty: 30 TABLET | Refills: 0 | Status: SHIPPED | OUTPATIENT
Start: 2023-01-20

## 2023-01-20 RX ADMIN — PRENATAL WITH FERROUS FUM AND FOLIC ACID 1 TABLET: 3080; 920; 120; 400; 22; 1.84; 3; 20; 10; 1; 12; 200; 27; 25; 2 TABLET ORAL at 07:51

## 2023-01-20 RX ADMIN — IBUPROFEN 800 MG: 800 TABLET, FILM COATED ORAL at 10:37

## 2023-01-20 RX ADMIN — SIMETHICONE 125 MG: 125 TABLET, CHEWABLE ORAL at 10:36

## 2023-01-20 ASSESSMENT — PAIN DESCRIPTION - PAIN TYPE: TYPE: ACUTE PAIN

## 2023-01-20 NOTE — PROGRESS NOTES
The patient is today postoperative day #4 status post primary  section.  She does experience abdominal soreness.  She is ambulating and urinating and tolerating a regular diet.  Vital signs: The patient's temperature is 37.2 °C (98.9 °F) and her heart rate is 62 bpm and her respiratory rate is 18 breaths/min and her pulse oximetry is 100% on room air and her blood pressure is 111/66.  General: The patient appears well-developed and well-nourished and relaxed and alert and comfortable and in no apparent distress.  Labs: The patient's postoperative hemoglobin on 2023 (3 days ago) was 8.4 g/dL.  Assessment:  The patient is today postoperative day #4 status post primary low transverse  section (emergency  section).  Postoperative anemia which appears to be stable and asymptomatic.  Plan:  We will discharge home later today with prescriptions for Percocet and ibuprofen and iron sulfate and stool softener.  I explained to her that her blood work reveals postoperative anemia and that I would therefore like for her to take oral iron daily for the next month along with prenatal vitamins daily for the next month and she acknowledged these things.  We will also prescribe stool softeners.  I asked her to return to see me in the office when she is about 2 weeks postop for a postoperative visit and she said that she would do so.  I asked her to call or contact me at any time should she ever have any problems or questions or complaints and she said that she would do so.  Jefferson Cook MD

## 2023-01-20 NOTE — PROGRESS NOTES
Patient up and walking much more this afternoon and evening, encouraged to walk x3 webb. Visiting much more this afternoon and evening as well with infant in nicu.

## 2023-01-20 NOTE — CARE PLAN
Problem: Pain - Standard  Goal: Alleviation of pain or a reduction in pain to the patient’s comfort goal  Outcome: Progressing     Problem: Knowledge Deficit - Standard  Goal: Patient and family/care givers will demonstrate understanding of plan of care, disease process/condition, diagnostic tests and medications  Outcome: Progressing   The patient is Stable - Low risk of patient condition declining or worsening    Shift Goals  Clinical Goals: Safety  Patient Goals: Safety  Family Goals: GABRIEL    Progress made toward(s) clinical / shift goals:  4/10 abdominal/incisional pain - no request for prn pain meds at this time, baby in nicu, mom pumping, no needs at this time, wctm    Patient is not progressing towards the following goals: n/a    Fall precautions/hourly rounding maintained, call light within reach and functioning, all items within reach.  Patient encouraged to call for assistance, poc reviewed with patient, ?'s/concerns answered.

## 2023-01-20 NOTE — PROGRESS NOTES
0700-Report received, no needs from patient at this time.  0900- Pt assessed, fundus firm, lochia light. No s/s of distress. Bonding well w/ infant. Visited NICU. Up and voiding. Medicated for pain at this time, abd inc w/ dermabalice woodrow, cdi, pt passing gas.

## 2023-01-20 NOTE — PROGRESS NOTES
Meds-to-Beds: Discharge prescription orders listed below delivered to patient's nurse. RN Ivan notified. Patient counseled.      Current Outpatient Medications   Medication Sig Dispense Refill    oxyCODONE-acetaminophen (PERCOCET) 5-325 MG Tab Take 1 Tablet by mouth every 6 hours as needed for Moderate Pain or Severe Pain for up to 7 days. 28 Tablet 0    ibuprofen (MOTRIN) 800 MG Tab Take 1 Tablet by mouth every 8 hours as needed for Mild Pain or Moderate Pain. 30 Tablet 0    ferrous sulfate 325 (65 Fe) MG tablet Take 1 Tablet by mouth every day. 30 Tablet 0    docusate sodium (COLACE) 100 MG Cap Take 1 Capsule by mouth 2 times a day. 60 Capsule 0      Dae Davidson, Pharmacy Intern

## 2023-01-20 NOTE — DISCHARGE INSTRUCTIONS

## 2023-01-20 NOTE — PROGRESS NOTES
Patient to be discharged today - patient aware and agreeable to plan. D/c instructions reviewed with patient - ?'s/concerns answered. Meds to beds given to patient.

## 2023-01-20 NOTE — LACTATION NOTE
Wade is still undecided if she wants to apply for WIC. I offered her a manual breast pump but she declined, telling her nurse that she has one she's planning to borrow.

## 2023-01-20 NOTE — CARE PLAN
The patient is Stable - Low risk of patient condition declining or worsening    Shift Goals  Clinical Goals: Safety  Patient Goals: Safety    Progress made toward(s) clinical / shift goals:      Problem: Pain - Standard  Goal: Alleviation of pain or a reduction in pain to the patient’s comfort goal  Outcome: Progressing  Flowsheets (Taken 1/19/2023 2127)  Pain Rating Scale (NPRS): 6  Note: Medicated per MAR. Will continue to assess and treat accordingly.     Problem: Knowledge Deficit - Postpartum  Goal: Patient will verbalize and demonstrate understanding of self and infant care  Outcome: Progressing  Note: Educated on POC, pain management, medication administration, ambulation, safety, diet, rest, usage of call light, interventions in place to maintain/ improve skin integrity, monitoring input/ output, pumping, incision care/ support, vital sign stability, incision care, self care. Will continue to educate on POC accordingly.

## (undated) DEVICE — PAD GROUNDING PRE-JELLED - (50EA/PK)

## (undated) DEVICE — RETRACTOR O C SECTION LRY - (5/BX)

## (undated) DEVICE — TUBING CLEARLINK DUO-VENT - C-FLO (48EA/CA)

## (undated) DEVICE — PACK C-SECTION (2EA/CA)

## (undated) DEVICE — TRAY SPINAL ANESTHESIA NON-SAFETY (10/CA)

## (undated) DEVICE — SODIUM CHL IRRIGATION 0.9% 1000ML (12EA/CA)

## (undated) DEVICE — SUTURE 2-0 CHROMIC GUT CT-1 27 (36PK/BX)"

## (undated) DEVICE — CANISTER SUCTION 3000ML MECHANICAL FILTER AUTO SHUTOFF MEDI-VAC NONSTERILE LF DISP  (40EA/CA)

## (undated) DEVICE — TAPE CLOTH MEDIPORE 6 INCH - (12RL/CA)

## (undated) DEVICE — HEAD HOLDER JUNIOR/ADULT

## (undated) DEVICE — GLOVE BIOGEL SZ 7.5 SURGICAL PF LTX - (50PR/BX 4BX/CA)

## (undated) DEVICE — SUTURE 3-0 MONOCRYL PLUS PS-1 - 27 INCH (36/BX)

## (undated) DEVICE — SUTURE 1 CHROMIC CTX ETHICON - (36PK/BX)

## (undated) DEVICE — DRESSING ABDOMINAL PAD STERILE 8 X 10" (360EA/CA)"

## (undated) DEVICE — SET EXTENSION WITH 2 PORTS (48EA/CA) ***PART #2C8610 IS A SUBSTITUTE*****

## (undated) DEVICE — WATER IRRIGATION STERILE 1000ML (12EA/CA)

## (undated) DEVICE — SLEEVE, SEQUENTIAL CALF REG

## (undated) DEVICE — TELFA 8 X 10 BIOSEAL - (50EA/CA)

## (undated) DEVICE — Device

## (undated) DEVICE — SUTURE 3-0 VICRYL PLUS CT-1 - 36 INCH (36/BX)

## (undated) DEVICE — GLOVE BIOGEL SZ 7 SURGICAL PF LTX - (50PR/BX 4BX/CA)

## (undated) DEVICE — SPONGE GAUZESTER 4 X 4 4PLY - (128PK/CA)

## (undated) DEVICE — CATHETER IV NON-SAFETY 18 GA X 1 1/4 (50/BX 4BX/CA)

## (undated) DEVICE — SURGIFOAM (SIZE 100) - (6EA/CA)

## (undated) DEVICE — SUTURE 1 VICRYL PLUS CTX - 36 INCH (36/BX)

## (undated) DEVICE — KIT  I.V. START (100EA/CA)

## (undated) DEVICE — CHLORAPREP 26 ML APPLICATOR - ORANGE TINT(25/CA)

## (undated) DEVICE — BLANKET UNDERBODY FULL ACCES - (5/CA)

## (undated) DEVICE — PACK ROOM TURNOVER L&D (12/CA)